# Patient Record
Sex: MALE | Race: WHITE | URBAN - METROPOLITAN AREA
[De-identification: names, ages, dates, MRNs, and addresses within clinical notes are randomized per-mention and may not be internally consistent; named-entity substitution may affect disease eponyms.]

---

## 2020-07-27 ENCOUNTER — CONVERSION ENCOUNTER (OUTPATIENT)
Dept: GASTROENTEROLOGY | Facility: CLINIC | Age: 50
End: 2020-07-27
Attending: INTERNAL MEDICINE

## 2020-09-11 ENCOUNTER — HOSPITAL ENCOUNTER (OUTPATIENT)
Dept: PREADMISSION TESTING | Facility: HOSPITAL | Age: 50
Discharge: HOME | End: 2020-09-11
Attending: INTERNAL MEDICINE

## 2020-09-12 LAB — SARS-COV-2 RNA SPEC QL NAA+PROBE: NOT DETECTED
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
## 2021-05-13 ENCOUNTER — HOSPITAL ENCOUNTER (OUTPATIENT)
Dept: VACCINE CLINIC | Facility: HOSPITAL | Age: 51
Discharge: HOME | End: 2021-05-13
Attending: INTERNAL MEDICINE

## 2022-05-02 ENCOUNTER — HOSPITAL ENCOUNTER (EMERGENCY)
Facility: HOSPITAL | Age: 52
Discharge: LEFT BEFORE DISCHARGE | End: 2022-05-02

## 2022-05-02 PROCEDURE — 99211 OFF/OP EST MAY X REQ PHY/QHP: CPT

## 2023-08-03 ENCOUNTER — APPOINTMENT (OUTPATIENT)
Dept: GENERAL RADIOLOGY | Facility: HOSPITAL | Age: 53
DRG: 639 | End: 2023-08-03
Payer: COMMERCIAL

## 2023-08-03 ENCOUNTER — HOSPITAL ENCOUNTER (INPATIENT)
Facility: HOSPITAL | Age: 53
LOS: 2 days | Discharge: HOME HEALTH | DRG: 639 | End: 2023-08-05
Attending: EMERGENCY MEDICINE | Admitting: INTERNAL MEDICINE
Payer: COMMERCIAL

## 2023-08-03 DIAGNOSIS — E13.10 DIABETIC KETOACIDOSIS WITHOUT COMA ASSOCIATED WITH OTHER SPECIFIED DIABETES MELLITUS: Primary | ICD-10-CM

## 2023-08-03 PROBLEM — E11.10 DKA (DIABETIC KETOACIDOSIS): Status: ACTIVE | Noted: 2023-08-03

## 2023-08-03 LAB
ACETONE BLD QL: ABNORMAL
ALBUMIN SERPL-MCNC: 3.9 G/DL (ref 3.5–5.2)
ALBUMIN/GLOB SERPL: 1.3 G/DL
ALP SERPL-CCNC: 80 U/L (ref 39–117)
ALT SERPL W P-5'-P-CCNC: 7 U/L (ref 1–41)
ANION GAP SERPL CALCULATED.3IONS-SCNC: 15.7 MMOL/L (ref 5–15)
ANION GAP SERPL CALCULATED.3IONS-SCNC: 19.4 MMOL/L (ref 5–15)
ANION GAP SERPL CALCULATED.3IONS-SCNC: 22 MMOL/L (ref 5–15)
AST SERPL-CCNC: 6 U/L (ref 1–40)
BACTERIA UR QL AUTO: ABNORMAL /HPF
BASE EXCESS BLDV CALC-SCNC: -12.3 MMOL/L (ref -2–2)
BASOPHILS # BLD AUTO: 0.05 10*3/MM3 (ref 0–0.2)
BASOPHILS # BLD AUTO: 0.06 10*3/MM3 (ref 0–0.2)
BASOPHILS NFR BLD AUTO: 0.6 % (ref 0–1.5)
BASOPHILS NFR BLD AUTO: 0.7 % (ref 0–1.5)
BDY SITE: ABNORMAL
BILIRUB SERPL-MCNC: 0.4 MG/DL (ref 0–1.2)
BILIRUB UR QL STRIP: NEGATIVE
BUN SERPL-MCNC: 25 MG/DL (ref 6–20)
BUN SERPL-MCNC: 27 MG/DL (ref 6–20)
BUN SERPL-MCNC: 33 MG/DL (ref 6–20)
BUN/CREAT SERPL: 34.4 (ref 7–25)
BUN/CREAT SERPL: 37.5 (ref 7–25)
BUN/CREAT SERPL: 41 (ref 7–25)
CALCIUM SPEC-SCNC: 8.3 MG/DL (ref 8.6–10.5)
CALCIUM SPEC-SCNC: 8.4 MG/DL (ref 8.6–10.5)
CALCIUM SPEC-SCNC: 9.1 MG/DL (ref 8.6–10.5)
CHLORIDE SERPL-SCNC: 101 MMOL/L (ref 98–107)
CHLORIDE SERPL-SCNC: 102 MMOL/L (ref 98–107)
CHLORIDE SERPL-SCNC: 98 MMOL/L (ref 98–107)
CLARITY UR: CLEAR
CO2 SERPL-SCNC: 12.3 MMOL/L (ref 22–29)
CO2 SERPL-SCNC: 12.6 MMOL/L (ref 22–29)
CO2 SERPL-SCNC: 13 MMOL/L (ref 22–29)
COHGB MFR BLD: 2.5 % (ref 0–1.5)
COLOR UR: YELLOW
CREAT SERPL-MCNC: 0.61 MG/DL (ref 0.76–1.27)
CREAT SERPL-MCNC: 0.72 MG/DL (ref 0.76–1.27)
CREAT SERPL-MCNC: 0.96 MG/DL (ref 0.76–1.27)
DEPRECATED RDW RBC AUTO: 39.7 FL (ref 37–54)
DEPRECATED RDW RBC AUTO: 39.8 FL (ref 37–54)
EGFRCR SERPLBLD CKD-EPI 2021: 109.2 ML/MIN/1.73
EGFRCR SERPLBLD CKD-EPI 2021: 114.9 ML/MIN/1.73
EGFRCR SERPLBLD CKD-EPI 2021: 94.5 ML/MIN/1.73
EOSINOPHIL # BLD AUTO: 0.02 10*3/MM3 (ref 0–0.4)
EOSINOPHIL # BLD AUTO: 0.07 10*3/MM3 (ref 0–0.4)
EOSINOPHIL NFR BLD AUTO: 0.2 % (ref 0.3–6.2)
EOSINOPHIL NFR BLD AUTO: 0.8 % (ref 0.3–6.2)
ERYTHROCYTE [DISTWIDTH] IN BLOOD BY AUTOMATED COUNT: 13.4 % (ref 12.3–15.4)
ERYTHROCYTE [DISTWIDTH] IN BLOOD BY AUTOMATED COUNT: 14.2 % (ref 12.3–15.4)
FHHB: 28 % (ref 0–5)
GEN 5 2HR TROPONIN T REFLEX: 8 NG/L
GLOBULIN UR ELPH-MCNC: 3 GM/DL
GLUCOSE BLDC GLUCOMTR-MCNC: 178 MG/DL (ref 70–99)
GLUCOSE BLDC GLUCOMTR-MCNC: 202 MG/DL (ref 70–99)
GLUCOSE BLDC GLUCOMTR-MCNC: 225 MG/DL (ref 70–99)
GLUCOSE BLDC GLUCOMTR-MCNC: 231 MG/DL (ref 70–99)
GLUCOSE BLDC GLUCOMTR-MCNC: 236 MG/DL (ref 70–99)
GLUCOSE BLDC GLUCOMTR-MCNC: 271 MG/DL (ref 70–99)
GLUCOSE BLDC GLUCOMTR-MCNC: 337 MG/DL (ref 70–99)
GLUCOSE BLDC GLUCOMTR-MCNC: 344 MG/DL (ref 70–99)
GLUCOSE BLDC GLUCOMTR-MCNC: 465 MG/DL (ref 70–99)
GLUCOSE SERPL-MCNC: 277 MG/DL (ref 65–99)
GLUCOSE SERPL-MCNC: 313 MG/DL (ref 65–99)
GLUCOSE SERPL-MCNC: 445 MG/DL (ref 65–99)
GLUCOSE UR STRIP-MCNC: ABNORMAL MG/DL
HBA1C MFR BLD: 10.4 % (ref 4.8–5.6)
HCO3 BLDV-SCNC: 13.4 MMOL/L (ref 22–26)
HCT VFR BLD AUTO: 50.7 % (ref 37.5–51)
HCT VFR BLD AUTO: 53 % (ref 37.5–51)
HGB BLD-MCNC: 17.9 G/DL (ref 13–17.7)
HGB BLD-MCNC: 18.8 G/DL (ref 13–17.7)
HGB BLDA-MCNC: 19.6 G/DL (ref 13.8–16.4)
HGB UR QL STRIP.AUTO: ABNORMAL
HOLD SPECIMEN: NORMAL
HOLD SPECIMEN: NORMAL
HYALINE CASTS UR QL AUTO: ABNORMAL /LPF
IMM GRANULOCYTES # BLD AUTO: 0.03 10*3/MM3 (ref 0–0.05)
IMM GRANULOCYTES # BLD AUTO: 0.04 10*3/MM3 (ref 0–0.05)
IMM GRANULOCYTES NFR BLD AUTO: 0.3 % (ref 0–0.5)
IMM GRANULOCYTES NFR BLD AUTO: 0.5 % (ref 0–0.5)
INHALED O2 CONCENTRATION: ABNORMAL %
KETONES UR QL STRIP: ABNORMAL
LEUKOCYTE ESTERASE UR QL STRIP.AUTO: NEGATIVE
LYMPHOCYTES # BLD AUTO: 1.28 10*3/MM3 (ref 0.7–3.1)
LYMPHOCYTES # BLD AUTO: 1.52 10*3/MM3 (ref 0.7–3.1)
LYMPHOCYTES NFR BLD AUTO: 15.7 % (ref 19.6–45.3)
LYMPHOCYTES NFR BLD AUTO: 17.6 % (ref 19.6–45.3)
MAGNESIUM SERPL-MCNC: 1.8 MG/DL (ref 1.6–2.6)
MAGNESIUM SERPL-MCNC: 1.9 MG/DL (ref 1.6–2.6)
MAGNESIUM SERPL-MCNC: 2.1 MG/DL (ref 1.6–2.6)
MCH RBC QN AUTO: 29 PG (ref 26.6–33)
MCH RBC QN AUTO: 29.1 PG (ref 26.6–33)
MCHC RBC AUTO-ENTMCNC: 35.3 G/DL (ref 31.5–35.7)
MCHC RBC AUTO-ENTMCNC: 35.5 G/DL (ref 31.5–35.7)
MCV RBC AUTO: 81.8 FL (ref 79–97)
MCV RBC AUTO: 82.3 FL (ref 79–97)
METHGB BLD QL: 0.1 % (ref 0–1.5)
MODALITY: ABNORMAL
MONOCYTES # BLD AUTO: 0.69 10*3/MM3 (ref 0.1–0.9)
MONOCYTES # BLD AUTO: 0.79 10*3/MM3 (ref 0.1–0.9)
MONOCYTES NFR BLD AUTO: 8.5 % (ref 5–12)
MONOCYTES NFR BLD AUTO: 9.1 % (ref 5–12)
NEUTROPHILS NFR BLD AUTO: 6.05 10*3/MM3 (ref 1.7–7)
NEUTROPHILS NFR BLD AUTO: 6.18 10*3/MM3 (ref 1.7–7)
NEUTROPHILS NFR BLD AUTO: 71.5 % (ref 42.7–76)
NEUTROPHILS NFR BLD AUTO: 74.5 % (ref 42.7–76)
NITRITE UR QL STRIP: NEGATIVE
NOTE: ABNORMAL
NRBC BLD AUTO-RTO: 0 /100 WBC (ref 0–0.2)
NRBC BLD AUTO-RTO: 0 /100 WBC (ref 0–0.2)
OSMOLALITY SERPL: 314 MOSM/KG (ref 275–300)
OXYHGB MFR BLDV: 69.4 % (ref 94–99)
PCO2 BLDV: 31.6 MM HG (ref 41–51)
PH BLDV: 7.24 PH UNITS (ref 7.31–7.41)
PH UR STRIP.AUTO: 5.5 [PH] (ref 5–8)
PHOSPHATE SERPL-MCNC: 1.8 MG/DL (ref 2.5–4.5)
PHOSPHATE SERPL-MCNC: 2.2 MG/DL (ref 2.5–4.5)
PHOSPHATE SERPL-MCNC: 2.3 MG/DL (ref 2.5–4.5)
PLATELET # BLD AUTO: 174 10*3/MM3 (ref 140–450)
PLATELET # BLD AUTO: 214 10*3/MM3 (ref 140–450)
PMV BLD AUTO: 9.2 FL (ref 6–12)
PMV BLD AUTO: 9.2 FL (ref 6–12)
PO2 BLDV: 34 MM HG (ref 35–42)
POTASSIUM SERPL-SCNC: 3.2 MMOL/L (ref 3.5–5.2)
POTASSIUM SERPL-SCNC: 3.6 MMOL/L (ref 3.5–5.2)
POTASSIUM SERPL-SCNC: 3.9 MMOL/L (ref 3.5–5.2)
PROCALCITONIN SERPL-MCNC: 0.08 NG/ML (ref 0–0.25)
PROT SERPL-MCNC: 6.9 G/DL (ref 6–8.5)
PROT UR QL STRIP: ABNORMAL
RBC # BLD AUTO: 6.16 10*6/MM3 (ref 4.14–5.8)
RBC # BLD AUTO: 6.48 10*6/MM3 (ref 4.14–5.8)
RBC # UR STRIP: ABNORMAL /HPF
REF LAB TEST METHOD: ABNORMAL
SAO2 % BLDCOV: 71.3 % (ref 45–75)
SODIUM SERPL-SCNC: 130 MMOL/L (ref 136–145)
SODIUM SERPL-SCNC: 133 MMOL/L (ref 136–145)
SODIUM SERPL-SCNC: 133 MMOL/L (ref 136–145)
SP GR UR STRIP: >1.03 (ref 1–1.03)
SQUAMOUS #/AREA URNS HPF: ABNORMAL /HPF
TROPONIN T DELTA: -1 NG/L
TROPONIN T SERPL HS-MCNC: 9 NG/L
TSH SERPL DL<=0.05 MIU/L-ACNC: 1.26 UIU/ML (ref 0.27–4.2)
UROBILINOGEN UR QL STRIP: ABNORMAL
WBC # UR STRIP: ABNORMAL /HPF
WBC NRBC COR # BLD: 8.13 10*3/MM3 (ref 3.4–10.8)
WBC NRBC COR # BLD: 8.65 10*3/MM3 (ref 3.4–10.8)
WHOLE BLOOD HOLD COAG: NORMAL
WHOLE BLOOD HOLD SPECIMEN: NORMAL

## 2023-08-03 PROCEDURE — 96368 THER/DIAG CONCURRENT INF: CPT

## 2023-08-03 PROCEDURE — 82820 HEMOGLOBIN-OXYGEN AFFINITY: CPT | Performed by: EMERGENCY MEDICINE

## 2023-08-03 PROCEDURE — 99285 EMERGENCY DEPT VISIT HI MDM: CPT

## 2023-08-03 PROCEDURE — 83735 ASSAY OF MAGNESIUM: CPT | Performed by: INTERNAL MEDICINE

## 2023-08-03 PROCEDURE — 82948 REAGENT STRIP/BLOOD GLUCOSE: CPT

## 2023-08-03 PROCEDURE — 82805 BLOOD GASES W/O2 SATURATION: CPT | Performed by: EMERGENCY MEDICINE

## 2023-08-03 PROCEDURE — 25010000002 ENOXAPARIN PER 10 MG: Performed by: INTERNAL MEDICINE

## 2023-08-03 PROCEDURE — 36415 COLL VENOUS BLD VENIPUNCTURE: CPT | Performed by: EMERGENCY MEDICINE

## 2023-08-03 PROCEDURE — 83930 ASSAY OF BLOOD OSMOLALITY: CPT | Performed by: EMERGENCY MEDICINE

## 2023-08-03 PROCEDURE — 85025 COMPLETE CBC W/AUTO DIFF WBC: CPT | Performed by: EMERGENCY MEDICINE

## 2023-08-03 PROCEDURE — 96366 THER/PROPH/DIAG IV INF ADDON: CPT

## 2023-08-03 PROCEDURE — 93005 ELECTROCARDIOGRAM TRACING: CPT | Performed by: EMERGENCY MEDICINE

## 2023-08-03 PROCEDURE — 80053 COMPREHEN METABOLIC PANEL: CPT | Performed by: EMERGENCY MEDICINE

## 2023-08-03 PROCEDURE — 0 POTASSIUM CHLORIDE PER 2 MEQ: Performed by: INTERNAL MEDICINE

## 2023-08-03 PROCEDURE — 83036 HEMOGLOBIN GLYCOSYLATED A1C: CPT | Performed by: EMERGENCY MEDICINE

## 2023-08-03 PROCEDURE — 96372 THER/PROPH/DIAG INJ SC/IM: CPT

## 2023-08-03 PROCEDURE — 96365 THER/PROPH/DIAG IV INF INIT: CPT

## 2023-08-03 PROCEDURE — 25010000002 SODIUM CHLORIDE 0.9 % WITH KCL 20 MEQ 20-0.9 MEQ/L-% SOLUTION: Performed by: INTERNAL MEDICINE

## 2023-08-03 PROCEDURE — 25010000002 CALCIUM GLUCONATE 2-0.675 GM/100ML-% SOLUTION: Performed by: INTERNAL MEDICINE

## 2023-08-03 PROCEDURE — 81001 URINALYSIS AUTO W/SCOPE: CPT | Performed by: EMERGENCY MEDICINE

## 2023-08-03 PROCEDURE — 71045 X-RAY EXAM CHEST 1 VIEW: CPT

## 2023-08-03 PROCEDURE — 93010 ELECTROCARDIOGRAM REPORT: CPT | Performed by: INTERNAL MEDICINE

## 2023-08-03 PROCEDURE — 99291 CRITICAL CARE FIRST HOUR: CPT | Performed by: INTERNAL MEDICINE

## 2023-08-03 PROCEDURE — 84100 ASSAY OF PHOSPHORUS: CPT | Performed by: INTERNAL MEDICINE

## 2023-08-03 PROCEDURE — 82009 KETONE BODYS QUAL: CPT | Performed by: EMERGENCY MEDICINE

## 2023-08-03 PROCEDURE — 84100 ASSAY OF PHOSPHORUS: CPT | Performed by: EMERGENCY MEDICINE

## 2023-08-03 PROCEDURE — 83735 ASSAY OF MAGNESIUM: CPT | Performed by: EMERGENCY MEDICINE

## 2023-08-03 PROCEDURE — 84484 ASSAY OF TROPONIN QUANT: CPT | Performed by: EMERGENCY MEDICINE

## 2023-08-03 PROCEDURE — 84145 PROCALCITONIN (PCT): CPT | Performed by: INTERNAL MEDICINE

## 2023-08-03 PROCEDURE — 25010000002 MAGNESIUM SULFATE IN D5W 1G/100ML (PREMIX) 1-5 GM/100ML-% SOLUTION: Performed by: INTERNAL MEDICINE

## 2023-08-03 PROCEDURE — 84443 ASSAY THYROID STIM HORMONE: CPT | Performed by: INTERNAL MEDICINE

## 2023-08-03 RX ORDER — MAGNESIUM SULFATE 1 G/100ML
1 INJECTION INTRAVENOUS
Status: COMPLETED | OUTPATIENT
Start: 2023-08-03 | End: 2023-08-03

## 2023-08-03 RX ORDER — SODIUM CHLORIDE 0.9 % (FLUSH) 0.9 %
10 SYRINGE (ML) INJECTION AS NEEDED
Status: DISCONTINUED | OUTPATIENT
Start: 2023-08-03 | End: 2023-08-04

## 2023-08-03 RX ORDER — DEXTROSE MONOHYDRATE 25 G/50ML
10-50 INJECTION, SOLUTION INTRAVENOUS
Status: DISCONTINUED | OUTPATIENT
Start: 2023-08-03 | End: 2023-08-05 | Stop reason: HOSPADM

## 2023-08-03 RX ORDER — FENTANYL/ROPIVACAINE/NS/PF 2-625MCG/1
15 PLASTIC BAG, INJECTION (ML) EPIDURAL
Status: COMPLETED | OUTPATIENT
Start: 2023-08-03 | End: 2023-08-03

## 2023-08-03 RX ORDER — AMOXICILLIN 250 MG
1 CAPSULE ORAL 2 TIMES DAILY
Status: DISCONTINUED | OUTPATIENT
Start: 2023-08-03 | End: 2023-08-05 | Stop reason: HOSPADM

## 2023-08-03 RX ORDER — HYDROCODONE BITARTRATE AND ACETAMINOPHEN 5; 325 MG/1; MG/1
1 TABLET ORAL EVERY 4 HOURS PRN
Status: DISCONTINUED | OUTPATIENT
Start: 2023-08-03 | End: 2023-08-05 | Stop reason: HOSPADM

## 2023-08-03 RX ORDER — ONDANSETRON 2 MG/ML
4 INJECTION INTRAMUSCULAR; INTRAVENOUS EVERY 4 HOURS PRN
Status: DISCONTINUED | OUTPATIENT
Start: 2023-08-03 | End: 2023-08-05 | Stop reason: HOSPADM

## 2023-08-03 RX ORDER — SODIUM CHLORIDE 9 MG/ML
250 INJECTION, SOLUTION INTRAVENOUS CONTINUOUS PRN
Status: DISCONTINUED | OUTPATIENT
Start: 2023-08-03 | End: 2023-08-04

## 2023-08-03 RX ORDER — SODIUM CHLORIDE AND POTASSIUM CHLORIDE 300; 900 MG/100ML; MG/100ML
250 INJECTION, SOLUTION INTRAVENOUS CONTINUOUS PRN
Status: DISCONTINUED | OUTPATIENT
Start: 2023-08-03 | End: 2023-08-04

## 2023-08-03 RX ORDER — FAMOTIDINE 20 MG/1
40 TABLET, FILM COATED ORAL DAILY
Status: DISCONTINUED | OUTPATIENT
Start: 2023-08-03 | End: 2023-08-05 | Stop reason: HOSPADM

## 2023-08-03 RX ORDER — MORPHINE SULFATE 2 MG/ML
2 INJECTION, SOLUTION INTRAMUSCULAR; INTRAVENOUS
Status: DISCONTINUED | OUTPATIENT
Start: 2023-08-03 | End: 2023-08-05 | Stop reason: HOSPADM

## 2023-08-03 RX ORDER — SODIUM CHLORIDE 0.9 % (FLUSH) 0.9 %
10 SYRINGE (ML) INJECTION EVERY 12 HOURS SCHEDULED
Status: DISCONTINUED | OUTPATIENT
Start: 2023-08-03 | End: 2023-08-05 | Stop reason: HOSPADM

## 2023-08-03 RX ORDER — BISACODYL 10 MG
10 SUPPOSITORY, RECTAL RECTAL DAILY PRN
Status: DISCONTINUED | OUTPATIENT
Start: 2023-08-03 | End: 2023-08-05 | Stop reason: HOSPADM

## 2023-08-03 RX ORDER — DEXTROSE AND SODIUM CHLORIDE 5; .45 G/100ML; G/100ML
150 INJECTION, SOLUTION INTRAVENOUS CONTINUOUS PRN
Status: DISCONTINUED | OUTPATIENT
Start: 2023-08-03 | End: 2023-08-04

## 2023-08-03 RX ORDER — SODIUM CHLORIDE 9 MG/ML
40 INJECTION, SOLUTION INTRAVENOUS AS NEEDED
Status: DISCONTINUED | OUTPATIENT
Start: 2023-08-03 | End: 2023-08-04

## 2023-08-03 RX ORDER — SODIUM CHLORIDE 450 MG/100ML
250 INJECTION, SOLUTION INTRAVENOUS CONTINUOUS PRN
Status: DISCONTINUED | OUTPATIENT
Start: 2023-08-03 | End: 2023-08-04

## 2023-08-03 RX ORDER — DEXTROSE MONOHYDRATE, SODIUM CHLORIDE, AND POTASSIUM CHLORIDE 50; 1.49; 4.5 G/1000ML; G/1000ML; G/1000ML
150 INJECTION, SOLUTION INTRAVENOUS CONTINUOUS PRN
Status: DISCONTINUED | OUTPATIENT
Start: 2023-08-03 | End: 2023-08-04

## 2023-08-03 RX ORDER — ACETAMINOPHEN 325 MG/1
650 TABLET ORAL EVERY 4 HOURS PRN
Status: DISCONTINUED | OUTPATIENT
Start: 2023-08-03 | End: 2023-08-05 | Stop reason: HOSPADM

## 2023-08-03 RX ORDER — DEXTROSE MONOHYDRATE, SODIUM CHLORIDE, AND POTASSIUM CHLORIDE 50; 2.98; 4.5 G/1000ML; G/1000ML; G/1000ML
150 INJECTION, SOLUTION INTRAVENOUS CONTINUOUS PRN
Status: DISCONTINUED | OUTPATIENT
Start: 2023-08-03 | End: 2023-08-04

## 2023-08-03 RX ORDER — POLYETHYLENE GLYCOL 3350 17 G/17G
17 POWDER, FOR SOLUTION ORAL DAILY PRN
Status: DISCONTINUED | OUTPATIENT
Start: 2023-08-03 | End: 2023-08-05 | Stop reason: HOSPADM

## 2023-08-03 RX ORDER — DEXTROSE AND SODIUM CHLORIDE 5; .9 G/100ML; G/100ML
150 INJECTION, SOLUTION INTRAVENOUS CONTINUOUS PRN
Status: DISCONTINUED | OUTPATIENT
Start: 2023-08-03 | End: 2023-08-04

## 2023-08-03 RX ORDER — SODIUM CHLORIDE AND POTASSIUM CHLORIDE 150; 450 MG/100ML; MG/100ML
250 INJECTION, SOLUTION INTRAVENOUS CONTINUOUS PRN
Status: DISCONTINUED | OUTPATIENT
Start: 2023-08-03 | End: 2023-08-04

## 2023-08-03 RX ORDER — NALOXONE HCL 0.4 MG/ML
0.4 VIAL (ML) INJECTION
Status: DISCONTINUED | OUTPATIENT
Start: 2023-08-03 | End: 2023-08-05 | Stop reason: HOSPADM

## 2023-08-03 RX ORDER — BISACODYL 5 MG/1
5 TABLET, DELAYED RELEASE ORAL DAILY PRN
Status: DISCONTINUED | OUTPATIENT
Start: 2023-08-03 | End: 2023-08-05 | Stop reason: HOSPADM

## 2023-08-03 RX ORDER — PHENYTOIN SODIUM 100 MG/1
200 CAPSULE, EXTENDED RELEASE ORAL 2 TIMES DAILY
Status: DISCONTINUED | OUTPATIENT
Start: 2023-08-03 | End: 2023-08-05 | Stop reason: HOSPADM

## 2023-08-03 RX ORDER — GLIPIZIDE 5 MG
5 TABLET, EXTENDED RELEASE 24 HR ORAL DAILY
COMMUNITY
Start: 2023-05-02 | End: 2023-08-05 | Stop reason: HOSPADM

## 2023-08-03 RX ORDER — PHENYTOIN SODIUM 100 MG/1
200 CAPSULE, EXTENDED RELEASE ORAL 2 TIMES DAILY
COMMUNITY

## 2023-08-03 RX ORDER — DAPAGLIFLOZIN 10 MG/1
10 TABLET, FILM COATED ORAL DAILY
COMMUNITY
End: 2023-08-05 | Stop reason: HOSPADM

## 2023-08-03 RX ORDER — SODIUM CHLORIDE AND POTASSIUM CHLORIDE 150; 900 MG/100ML; MG/100ML
250 INJECTION, SOLUTION INTRAVENOUS CONTINUOUS PRN
Status: DISCONTINUED | OUTPATIENT
Start: 2023-08-03 | End: 2023-08-04

## 2023-08-03 RX ORDER — TEMAZEPAM 7.5 MG/1
15 CAPSULE ORAL NIGHTLY PRN
Status: DISCONTINUED | OUTPATIENT
Start: 2023-08-03 | End: 2023-08-04

## 2023-08-03 RX ORDER — ENOXAPARIN SODIUM 100 MG/ML
40 INJECTION SUBCUTANEOUS NIGHTLY
Status: DISCONTINUED | OUTPATIENT
Start: 2023-08-03 | End: 2023-08-05 | Stop reason: HOSPADM

## 2023-08-03 RX ORDER — ALPRAZOLAM 0.25 MG/1
0.25 TABLET ORAL EVERY 6 HOURS PRN
Status: DISCONTINUED | OUTPATIENT
Start: 2023-08-03 | End: 2023-08-04

## 2023-08-03 RX ORDER — ALUMINA, MAGNESIA, AND SIMETHICONE 2400; 2400; 240 MG/30ML; MG/30ML; MG/30ML
15 SUSPENSION ORAL EVERY 6 HOURS PRN
Status: DISCONTINUED | OUTPATIENT
Start: 2023-08-03 | End: 2023-08-05 | Stop reason: HOSPADM

## 2023-08-03 RX ORDER — NICOTINE POLACRILEX 4 MG
15 LOZENGE BUCCAL
Status: DISCONTINUED | OUTPATIENT
Start: 2023-08-03 | End: 2023-08-04 | Stop reason: SDUPTHER

## 2023-08-03 RX ORDER — DEXTROSE MONOHYDRATE, SODIUM CHLORIDE, AND POTASSIUM CHLORIDE 50; 2.98; 9 G/1000ML; G/1000ML; G/1000ML
150 INJECTION, SOLUTION INTRAVENOUS CONTINUOUS PRN
Status: DISCONTINUED | OUTPATIENT
Start: 2023-08-03 | End: 2023-08-04

## 2023-08-03 RX ORDER — DEXTROSE MONOHYDRATE, SODIUM CHLORIDE, AND POTASSIUM CHLORIDE 50; 1.49; 9 G/1000ML; G/1000ML; G/1000ML
150 INJECTION, SOLUTION INTRAVENOUS CONTINUOUS PRN
Status: DISCONTINUED | OUTPATIENT
Start: 2023-08-03 | End: 2023-08-04

## 2023-08-03 RX ORDER — CALCIUM GLUCONATE 20 MG/ML
2000 INJECTION, SOLUTION INTRAVENOUS ONCE
Status: COMPLETED | OUTPATIENT
Start: 2023-08-03 | End: 2023-08-03

## 2023-08-03 RX ORDER — TOPIRAMATE 100 MG/1
100 TABLET, FILM COATED ORAL 2 TIMES DAILY
COMMUNITY

## 2023-08-03 RX ORDER — TOPIRAMATE 100 MG/1
100 TABLET, FILM COATED ORAL 2 TIMES DAILY
Status: DISCONTINUED | OUTPATIENT
Start: 2023-08-03 | End: 2023-08-04

## 2023-08-03 RX ADMIN — SODIUM CHLORIDE 1000 ML: 9 INJECTION, SOLUTION INTRAVENOUS at 19:36

## 2023-08-03 RX ADMIN — POTASSIUM CHLORIDE AND SODIUM CHLORIDE 250 ML/HR: 900; 150 INJECTION, SOLUTION INTRAVENOUS at 22:32

## 2023-08-03 RX ADMIN — SENNOSIDES AND DOCUSATE SODIUM 1 TABLET: 50; 8.6 TABLET ORAL at 21:15

## 2023-08-03 RX ADMIN — SODIUM CHLORIDE 1000 ML: 9 INJECTION, SOLUTION INTRAVENOUS at 20:45

## 2023-08-03 RX ADMIN — Medication 10 ML: at 21:16

## 2023-08-03 RX ADMIN — Medication 10 ML: at 21:15

## 2023-08-03 RX ADMIN — POTASSIUM CHLORIDE AND SODIUM CHLORIDE 250 ML/HR: 450; 150 INJECTION, SOLUTION INTRAVENOUS at 18:00

## 2023-08-03 RX ADMIN — ENOXAPARIN SODIUM 40 MG: 100 INJECTION SUBCUTANEOUS at 21:15

## 2023-08-03 RX ADMIN — POTASSIUM PHOSPHATE, MONOBASIC AND POTASSIUM PHOSPHATE, DIBASIC 15 MMOL: 224; 236 INJECTION, SOLUTION, CONCENTRATE INTRAVENOUS at 21:46

## 2023-08-03 RX ADMIN — CALCIUM GLUCONATE 2000 MG: 20 INJECTION, SOLUTION INTRAVENOUS at 18:46

## 2023-08-03 RX ADMIN — MAGNESIUM SULFATE HEPTAHYDRATE 1 G: 10 INJECTION, SOLUTION INTRAVENOUS at 19:59

## 2023-08-03 RX ADMIN — INSULIN HUMAN 2.8 UNITS/HR: 1 INJECTION, SOLUTION INTRAVENOUS at 15:41

## 2023-08-03 RX ADMIN — SODIUM CHLORIDE 2000 ML: 9 INJECTION, SOLUTION INTRAVENOUS at 15:40

## 2023-08-03 RX ADMIN — TOPIRAMATE 100 MG: 100 TABLET, FILM COATED ORAL at 21:15

## 2023-08-03 RX ADMIN — FAMOTIDINE 40 MG: 20 TABLET ORAL at 17:59

## 2023-08-03 RX ADMIN — PHENYTOIN SODIUM 200 MG: 100 CAPSULE, EXTENDED RELEASE ORAL at 21:15

## 2023-08-03 RX ADMIN — MAGNESIUM SULFATE HEPTAHYDRATE 1 G: 10 INJECTION, SOLUTION INTRAVENOUS at 21:17

## 2023-08-03 RX ADMIN — POTASSIUM PHOSPHATE, MONOBASIC AND POTASSIUM PHOSPHATE, DIBASIC 15 MMOL: 224; 236 INJECTION, SOLUTION, CONCENTRATE INTRAVENOUS at 18:38

## 2023-08-03 NOTE — H&P
[facility]   HISTORY AND PHYSICAL    Patient Name: John Ramos  : 1970  MRN: [id number]  Primary Care Physician:  Provider, No Known  Date of admission: 8/3/2023    Subjective   Subjective     Chief Complaint:   High sugars      HPI:    John Ramos is a 53 y.o. male who presents to the emergency department for evaluation of elevated blood sugar.  Patient is a known diabetic but is not on insulin reports he has had vomiting for the last several days.  He presented to his primary doctor's office he reports he looked very dehydrated and had elevated blood sugar.  On arrival to the emergency department patient does complain of being thirsty and having dry mucous membranes, work-up revealed high sugars and DKA.  Patient started on aggressive fluid hydration and admitted to medical service.  When I saw patient in ICU he is awake alert eating his dinner, he does not have any nausea, feels better      Review of Systems:    Fatigue and weakness.  High sugar.  Nausea vomiting.  No chest pain.  No shortness of breath    Personal History     Past Medical History:   Diagnosis Date   • Brain injury    • Diabetes    • Seizures        Past Surgical History:   Procedure Laterality Date   • BRAIN SURGERY     • FRACTURE SURGERY         Family History: family history is not on file. Otherwise pertinent FHx was reviewed and not pertinent to current issue.    Social History:  reports that he has never smoked. He has never used smokeless tobacco. He reports that he does not drink alcohol and does not use drugs.    Home Medications:  dapagliflozin Propanediol, glipizide, phenytoin ER, and topiramate      Allergies:  No Known Allergies    Objective   Objective     Vitals:   Temp:  [98.2 °F (36.8 °C)] 98.2 °F (36.8 °C)  Heart Rate:  [] 105  Resp:  [15] 15  BP: (141-159)/() 143/80    Physical Exam    Elderly looking male not in acute distress.  Oral mucosa moist  Neck supple   heart regular slightly  tachycardic  Lungs clear.  Neuro awake alert and oriented  Extremities no edema  .      I have personally reviewed the results from the time of this admission to 8/3/2023 18:30 EDT and agree with these findings:  [x]  Laboratory  []  Microbiology  [x]  Radiology  [x]  EKG/Telemetry   []  Cardiology/Vascular   []  Pathology  []  Old records  []  Other:    CBC:    WBC   Date Value Ref Range Status   08/03/2023 8.65 3.40 - 10.80 10*3/mm3 Final     RBC   Date Value Ref Range Status   08/03/2023 6.16 (H) 4.14 - 5.80 10*6/mm3 Final     Hemoglobin   Date Value Ref Range Status   08/03/2023 17.9 (H) 13.0 - 17.7 g/dL Final     Hematocrit   Date Value Ref Range Status   08/03/2023 50.7 37.5 - 51.0 % Final     MCV   Date Value Ref Range Status   08/03/2023 82.3 79.0 - 97.0 fL Final     MCH   Date Value Ref Range Status   08/03/2023 29.1 26.6 - 33.0 pg Final     MCHC   Date Value Ref Range Status   08/03/2023 35.3 31.5 - 35.7 g/dL Final     RDW   Date Value Ref Range Status   08/03/2023 13.4 12.3 - 15.4 % Final     RDW-SD   Date Value Ref Range Status   08/03/2023 39.7 37.0 - 54.0 fl Final     MPV   Date Value Ref Range Status   08/03/2023 9.2 6.0 - 12.0 fL Final     Platelets   Date Value Ref Range Status   08/03/2023 174 140 - 450 10*3/mm3 Final     Neutrophil %   Date Value Ref Range Status   08/03/2023 71.5 42.7 - 76.0 % Final     Lymphocyte %   Date Value Ref Range Status   08/03/2023 17.6 (L) 19.6 - 45.3 % Final     Monocyte %   Date Value Ref Range Status   08/03/2023 9.1 5.0 - 12.0 % Final     Eosinophil %   Date Value Ref Range Status   08/03/2023 0.8 0.3 - 6.2 % Final     Basophil %   Date Value Ref Range Status   08/03/2023 0.7 0.0 - 1.5 % Final     Immature Grans %   Date Value Ref Range Status   08/03/2023 0.3 0.0 - 0.5 % Final     Neutrophils, Absolute   Date Value Ref Range Status   08/03/2023 6.18 1.70 - 7.00 10*3/mm3 Final     Lymphocytes, Absolute   Date Value Ref Range Status   08/03/2023 1.52 0.70 - 3.10  10*3/mm3 Final     Monocytes, Absolute   Date Value Ref Range Status   08/03/2023 0.79 0.10 - 0.90 10*3/mm3 Final     Eosinophils, Absolute   Date Value Ref Range Status   08/03/2023 0.07 0.00 - 0.40 10*3/mm3 Final     Basophils, Absolute   Date Value Ref Range Status   08/03/2023 0.06 0.00 - 0.20 10*3/mm3 Final     Immature Grans, Absolute   Date Value Ref Range Status   08/03/2023 0.03 0.00 - 0.05 10*3/mm3 Final     nRBC   Date Value Ref Range Status   08/03/2023 0.0 0.0 - 0.2 /100 WBC Final        BMP:    Lab Results   Component Value Date    GLUCOSE 313 (H) 08/03/2023    BUN 27 (H) 08/03/2023    CREATININE 0.72 (L) 08/03/2023    BCR 37.5 (H) 08/03/2023    K 3.2 (L) 08/03/2023    CO2 12.6 (L) 08/03/2023    CALCIUM 8.4 (L) 08/03/2023    ALBUMIN 3.9 08/03/2023    AST 6 08/03/2023    ALT 7 08/03/2023        No radiology results for the last day           Assessment & Plan   Assessment / Plan       Current Diagnosis:  Active Hospital Problems    Diagnosis    • **DKA (diabetic ketoacidosis)      Plan:   Aggressive fluids.  Insulin drip.  DKA protocol.  Check A1c and thyroid.  Restart essential home meds including Dilantin.  Check labs in a.m.  Discussed with RN at bedside      DVT prophylaxis:  Medical DVT prophylaxis orders are present.    GI Prophylaxis:       Pepcid    CODE STATUS:    Level Of Support Discussed With: Patient  Code Status (Patient has no pulse and is not breathing): CPR (Attempt to Resuscitate)  Medical Interventions (Patient has pulse or is breathing): Full Support  Release to patient: Routine Release    Admission Status:  I believe this patient meets inpatient status.             I have dictated this note utilizing Dragon Dictation.             Please note that portions of this note were completed with a voice recognition program.             Part of this note may be an electronic transcription/translation of spoken language to printed text         using the Dragon Dictation System.        Electronically signed by Adarsh Veronica MD, 08/03/23, 6:30 PM EDT.    Total time spent with in evaluation and management: 36

## 2023-08-03 NOTE — CONSULTS
Pulmonary / Critical Care Consult Note      Patient Name: John Ramso  : 1970  MRN: [id number]  Primary Care Physician:  Provider, No Known  Referring Physician: No ref. provider found  Date of admission: 8/3/2023    Subjective   Subjective     Reason for Consult/ Chief Complaint: Diabetic ketoacidosis    HPI:  John Ramos is a 53 y.o. male with history of diabetes mellitus type 2, on glipizide and Farxiga at home.  He was having some nausea and vomiting for the past 5 days and had not taken his medication.  He presented to the ER with nausea, vomiting and abdominal pain for the last 5 days.  He was found to have blood sugar of 465, high anion gap at 22, low potassium, low sodium, Phos of 1.8.  ABG showed pH of 7.22, PCO2 31, PO2 34.  Bicarb 13.4.  He was found to be positive for serum acetone.  He is being admitted to ICU with diabetic ketoacidosis.  Pulmonary critical care services consulted for assistance with management of his acute issues.  He is complaining of some abdominal pain.  He is currently on insulin drip at 3.5 units/h.  He is on fluid exchange per DKA protocol.  He has some nausea, still has some abdominal pain.  No cough.  No fever or chills.  No nausea or vomiting.  Does not have any known heart or lung disease.    Review of Systems  General:  Fatigue, No Fever  HEENT: No dysphagia, No Visual Changes, no rhinorrhea  Respiratory:  + cough,+Dyspnea, No Pleuritic Pain, no wheezing, no hemoptysis  Cardiovascular: Denies chest pain, denies palpitations,+FINLEY, No Chest Pressure  Gastrointestinal:  Abdominal Pain, Nausea, Vomiting, No Diarrhea  Genitourinary:  No Dysuria, No Frequency, No Hesitancy  Musculoskeletal: No muscle pain or swelling  Endocrine:  No Heat Intolerance, No Cold Intolerance, Fatigue  Hematologic:  No Bleeding, No Bruising  Psychiatric:  No Anxiety, No Depression  Neurologic:  No Confusion, no Dysarthria, No Headaches  Skin:  No Rash, No Open  Wounds        Personal History     Past Medical History:   Diagnosis Date   • Brain injury    • Diabetes    • Seizures        Past Surgical History:   Procedure Laterality Date   • BRAIN SURGERY     • FRACTURE SURGERY         Family History: No known family history of chronic lung disease or heart disease.    Social History:  reports that he has never smoked. He has never used smokeless tobacco. Drug use questions deferred to the physician. He reports that he does not drink alcohol.    Home Medications:  dapagliflozin Propanediol, glipizide, phenytoin ER, and topiramate    Allergies:  No Known Allergies    Objective    Objective     Vitals:   Temp:  [98.2 °F (36.8 °C)] 98.2 °F (36.8 °C)  Heart Rate:  [101-109] 101  Resp:  [15] 15  BP: (141-158)/(80-82) 158/80    Physical Exam:  Vital Signs Reviewed   WDWN, Alert, in mild distress, has conversational dyspnea  HEENT:  PERRL, EOMI.  OP, nares clear, no sinus tenderness  Neck:  Supple, no JVD, no thyromegaly  Lymph: no axillary, cervical, supraclavicular lymphadenopathy noted bilaterally  Chest:  good aeration, clear to auscultation bilaterally, tympanic to percussion bilaterally, no work of breathing noted  CV: RRR, no MGR, pulses 2+, equal  Abd:  Soft, NT, ND, + BS, no HSM  EXT:  no clubbing, no cyanosis, no edema, no joint tenderness  Neuro:  A&Ox3, CN grossly intact, no focal deficits  Skin: No rashes or lesions noted      Result Review    Result Review:  I have personally reviewed the results from the time of this admission to 8/3/2023 18:16 EDT and agree with these findings:  [x]  Laboratory  [x]  Microbiology  [x]  Radiology  [x]  EKG/Telemetry   [x]  Cardiology/Vascular   []  Pathology  []  Old records  []  Other:  Most notable findings include:         Lab 08/03/23  1718 08/03/23  1228   WBC 8.65 8.13   HEMOGLOBIN 17.9* 18.8*   HEMATOCRIT 50.7 53.0*   PLATELETS 174 214   SODIUM 133* 133*   POTASSIUM 3.2* 3.9   CHLORIDE 101 98   CO2 12.6* 13.0*   BUN 27* 33*    CREATININE 0.72* 0.96   GLUCOSE 313* 445*   CALCIUM 8.4* 9.1   PHOSPHORUS 1.8* 2.3*   TOTAL PROTEIN  --  6.9   ALBUMIN  --  3.9   GLOBULIN  --  3.0       XR Chest 1 View    Result Date: 8/3/2023  PROCEDURE: XR CHEST 1 VW  COMPARISON: None  INDICATIONS: Assess for Fluid Overload  FINDINGS:   The lungs are well-expanded. The heart and pulmonary vasculature are within normal limits. No pleural effusions are identified. There are no active appearing infiltrates.  IMPRESSION: No active disease.  RADHA SAMSON MD       Electronically Signed and Approved By: RADHA SAMSON MD on 8/03/2023 at 15:20              Serum acetone positive.  VBG with pH 7.24, PCO2 31, PO2 of 34, bicarb 13.4.    Assessment & Plan   Assessment / Plan     Active Hospital Problems:  Active Hospital Problems    Diagnosis    • **DKA (diabetic ketoacidosis)          Impression:  Diabetic ketoacidosis  Noncompliance with medications, patient was supposed to be on glipizide and Farxiga at home  Severe hypophosphatemia and hypokalemia  Hypocalcemia  Volume depletion  Severe hyperglycemia  Anion gap metabolic acidosis  Electrolyte derangements    Plan:  Continue with insulin drip, currently running at 3.5 units/h  Start IV fluid boluses, and fluid exchange per DKA protocol.  Replete IV potassium phosphate.  Replete calcium gluconate IV.  Keep him n.p.o. for now.  Once anion gap closes, will start oral diet.  Will need diabetes management education.  Nutrition consult.  Monitor hemodynamics closely.      Patient is critically ill in ICU with diabetic ketoacidosis, severe hypophosphatemia, hypokalemia, hypocalcemia, volume depletion, severe hyperglycemia, anion gap metabolic acidosis and electrolyte derangements.  I spent 33 minutes of critical care time, excluding any procedure notes, in review, analysis, obtaining history and physical, formulating care plan, and I led multi-disciplinary critical care rounds with bedside nurse, respiratory therapist,  clinical pharmacist and other allied services. I have discussed the case with primary service and other consultants as well.     Electronically signed by Rocky Galvan MD, 8/3/2023, 18:16 EDT.

## 2023-08-03 NOTE — ED PROVIDER NOTES
Time: 2:15 PM EDT  Date of encounter:  8/3/2023  Independent Historian/Clinical History and Information was obtained by:   Patient    History is limited by: N/A    Chief Complaint: High blood sugar      History of Present Illness:  Patient is a 53 y.o. year old male who presents to the emergency department for evaluation of elevated blood sugar.  Patient is a known diabetic but is not on insulin reports he has had vomiting for the last several days.  He presented to his primary doctor's office he reports he looked very dehydrated and had elevated blood sugar.  On arrival to the emergency department patient does complain of being thirsty and having dry mucous membranes.    HPI    Patient Care Team  Primary Care Provider: Provider, No Known    Past Medical History:     No Known Allergies  Past Medical History:   Diagnosis Date    Brain injury     Diabetes      History reviewed. No pertinent surgical history.  History reviewed. No pertinent family history.    Home Medications:  Prior to Admission medications    Not on File        Social History:   Social History     Tobacco Use    Smoking status: Never    Smokeless tobacco: Never   Vaping Use    Vaping Use: Never used   Substance Use Topics    Alcohol use: Never    Drug use: Defer         Review of Systems:  Review of Systems   Constitutional:  Negative for chills and fever.   HENT:  Negative for congestion, rhinorrhea and sore throat.    Eyes:  Negative for pain and visual disturbance.   Respiratory:  Negative for apnea, cough, chest tightness and shortness of breath.    Cardiovascular:  Negative for chest pain and palpitations.   Gastrointestinal:  Positive for nausea and vomiting. Negative for abdominal pain and diarrhea.   Genitourinary:  Negative for difficulty urinating and dysuria.   Musculoskeletal:  Negative for joint swelling and myalgias.   Skin:  Negative for color change.   Neurological:  Negative for seizures and headaches.   Psychiatric/Behavioral:  "Negative.     All other systems reviewed and are negative.     Physical Exam:  /82 (BP Location: Right arm)   Pulse 109   Temp 98.2 °F (36.8 °C) (Oral)   Resp 15   Ht 172.7 cm (68\")   Wt 96.5 kg (212 lb 11.9 oz)   SpO2 95%   BMI 32.35 kg/m²     Physical Exam  Vitals and nursing note reviewed.   Constitutional:       General: He is not in acute distress.     Appearance: Normal appearance. He is not toxic-appearing.   HENT:      Head: Normocephalic and atraumatic.      Jaw: There is normal jaw occlusion.      Mouth/Throat:      Mouth: Mucous membranes are dry.   Eyes:      General: Lids are normal.      Extraocular Movements: Extraocular movements intact.      Conjunctiva/sclera: Conjunctivae normal.      Pupils: Pupils are equal, round, and reactive to light.   Cardiovascular:      Rate and Rhythm: Regular rhythm. Tachycardia present.      Pulses: Normal pulses.      Heart sounds: Normal heart sounds.   Pulmonary:      Effort: Pulmonary effort is normal. No respiratory distress.      Breath sounds: Normal breath sounds. No wheezing or rhonchi.   Abdominal:      General: Abdomen is flat.      Palpations: Abdomen is soft.      Tenderness: There is no abdominal tenderness. There is no guarding or rebound.   Musculoskeletal:         General: Normal range of motion.      Cervical back: Normal range of motion and neck supple.      Right lower leg: No edema.      Left lower leg: No edema.   Skin:     General: Skin is warm and dry.   Neurological:      Mental Status: He is alert and oriented to person, place, and time. Mental status is at baseline.   Psychiatric:         Mood and Affect: Mood normal.                Procedures:  Procedures      Medical Decision Making:      Comorbidities that affect care:    Diabetes    External Notes reviewed:    None      The following orders were placed and all results were independently analyzed by me:  Orders Placed This Encounter   Procedures    XR Chest 1 View    [place] " Draw    Comprehensive Metabolic Panel    Urinalysis With Microscopic If Indicated (No Culture) - Urine, Clean Catch    CBC Auto Differential    Blood Gas, Venous -With Co-Ox Panel: Yes    Acetone    Phosphorus    Magnesium    Osmolality, Serum    Hemoglobin A1c    High Sensitivity Troponin T    Basic Metabolic Panel    Magnesium    Phosphorus    CBC Auto Differential    High Sensitivity Troponin T 2Hr    NPO Diet NPO Type: Strict NPO    Undress & Gown    Vital Signs    Vital Signs    Strict Intake & Output    Daily Weights    Continuous Pulse Oximetry    Saline Lock & Maintain IV Access    Corrected Serum Sodium = Measured Sodium + [1.6 x ((Glucose - 100)/100)]    Do NOT Discontinue Insulin Infusion Until 2 Hours After First Dose of Basal SQ Insulin    Prior to Initiating Glucommander™, Ensure All Prior Insulin Orders Are Discontinued    Do Not Start Insulin Infusion if Potassium < 3.3    Use a Dedicated Line for Insulin Infusion (If Possible).  May Use a Carrier Fluid of NS at KVO Rate if Insulin Rate is Insufficient to Maintain IV Patency.  Prime IV Line With Insulin Infusion    Glucommander Must Be Discontinued if Insulin Infusion is Discontinued.  If Insulin Infusion is Restarted, Previous Glucommander Settings Must Be Discontinued and Re-Entered From New Order    Once DKA Meets Resolution Criteria - Call Provider for Transition Orders From IV to SQ Insulin    Utilize the Start Meal Feature / Meal Bolus Feature in Glucommander if Patient Starts a Diet or Bolus Tube Feedings    Notify Provider - Insulin Infusion    RN to Release PRN POC Glucose Orders Per Glucommander    RN to Order STAT Glucose For Any POC Glucose <10 or >600    If Insulin Infusion is Paused - Follow Glucommander Instructions    DKA Patients With Phosphorus Level 1 or Higher Do NOT Require Replacement (While Insulin Infusing)    Inpatient Diabetes Educator Consult    Inpatient Hospitalist Consult    IP General Consult (Use specialty-specific  consult if known)    Oxygen Therapy- Nasal Cannula; Titrate 1-6 LPM Per SpO2; 90 - 95%    POC Glucose Q1H    POC Glucose Once    POC Glucose PRN    POC Glucose Once    ECG 12 Lead Electrolyte Imbalance    Insert Peripheral IV    Insert Peripheral IV x2    Inpatient Admission    CBC & Differential    Green Top (Gel)    Lavender Top    Gold Top - SST    Light Blue Top    CBC & Differential       Medications Given in the Emergency Department:  Medications   sodium chloride 0.9 % flush 10 mL (has no administration in time range)   sodium chloride 0.9 % flush 10 mL (has no administration in time range)   sodium chloride 0.9 % flush 10 mL (has no administration in time range)   sodium chloride 0.9 % infusion 40 mL (has no administration in time range)   dextrose (GLUTOSE) oral gel 15 g (has no administration in time range)   dextrose (D50W) (25 g/50 mL) IV injection 10-50 mL (has no administration in time range)   glucagon (GLUCAGEN) injection 1 mg (has no administration in time range)   sodium chloride 0.9 % bolus (has no administration in time range)   sodium chloride 0.9 % infusion (has no administration in time range)   sodium chloride 0.9 % with KCl 20 mEq/L infusion (has no administration in time range)   sodium chloride 0.9 % with KCl 40 mEq/L infusion (has no administration in time range)   dextrose 5 % and sodium chloride 0.9 % infusion (has no administration in time range)   dextrose 5 % and sodium chloride 0.9 % with KCl 20 mEq/L infusion (has no administration in time range)   dextrose 5 % and sodium chloride 0.9 % with KCl 40 mEq/L infusion (has no administration in time range)   sodium chloride 0.45 % infusion (has no administration in time range)   sodium chloride 0.45 % with KCl 20 mEq/L infusion (has no administration in time range)   sodium chloride 0.45 % 1,000 mL with potassium chloride 40 mEq infusion (has no administration in time range)   dextrose 5 % and sodium chloride 0.45 % infusion (has no  administration in time range)   dextrose 5 % and sodium chloride 0.45 % with KCl 20 mEq/L infusion (has no administration in time range)   dextrose 5 % and sodium chloride 0.45 % with KCl 40 mEq/L infusion (has no administration in time range)   insulin regular 1 unit/mL in 0.9% sodium chloride (Glucommander) (2.8 Units/hr Intravenous New Bag 8/3/23 1541)   Potassium Replacement - Follow Nurse / BPA Driven Protocol (has no administration in time range)   Magnesium Standard Dose Replacement - Follow Nurse / BPA Driven Protocol (has no administration in time range)   Phosphorus Replacement - Follow Nurse / BPA Driven Protocol (has no administration in time range)   Calcium Replacement - Follow Nurse / BPA Driven Protocol (has no administration in time range)   sodium chloride 0.9 % bolus 2,000 mL (2,000 mL Intravenous New Bag 8/3/23 1540)        ED Course:         Labs:    Lab Results (last 24 hours)       Procedure Component Value Units Date/Time    POC Glucose Once [[id number]]  (Abnormal) Collected: 08/03/23 1151    Specimen: Blood Updated: 08/03/23 1154     Glucose 465 mg/dL      Comment: Serial Number: [id number]:  [id number]       CBC & Differential [[id number]]  (Abnormal) Collected: 08/03/23 1228    Specimen: Blood Updated: 08/03/23 1230    Narrative:      The following orders were created for panel order CBC & Differential.  Procedure                               Abnormality         Status                     ---------                               -----------         ------                     CBC Auto Differential[[id number]]        Abnormal            Final result                 Please view results for these tests on the individual orders.    Comprehensive Metabolic Panel [[id number]]  (Abnormal) Collected: 08/03/23 1228    Specimen: Blood Updated: 08/03/23 1259     Glucose 445 mg/dL      BUN 33 mg/dL      Creatinine 0.96 mg/dL      Sodium 133 mmol/L      Potassium 3.9 mmol/L      Chloride 98 mmol/L       CO2 13.0 mmol/L      Calcium 9.1 mg/dL      Total Protein 6.9 g/dL      Albumin 3.9 g/dL      ALT (SGPT) 7 U/L      AST (SGOT) 6 U/L      Alkaline Phosphatase 80 U/L      Total Bilirubin 0.4 mg/dL      Globulin 3.0 gm/dL      A/G Ratio 1.3 g/dL      BUN/Creatinine Ratio 34.4     Anion Gap 22.0 mmol/L      eGFR 94.5 mL/min/1.73     Narrative:      GFR Normal >60  Chronic Kidney Disease <60  Kidney Failure <15      CBC Auto Differential [[id number]]  (Abnormal) Collected: 08/03/23 1228    Specimen: Blood Updated: 08/03/23 1230     WBC 8.13 10*3/mm3      RBC 6.48 10*6/mm3      Hemoglobin 18.8 g/dL      Hematocrit 53.0 %      MCV 81.8 fL      MCH 29.0 pg      MCHC 35.5 g/dL      RDW 14.2 %      RDW-SD 39.8 fl      MPV 9.2 fL      Platelets 214 10*3/mm3      Neutrophil % 74.5 %      Lymphocyte % 15.7 %      Monocyte % 8.5 %      Eosinophil % 0.2 %      Basophil % 0.6 %      Immature Grans % 0.5 %      Neutrophils, Absolute 6.05 10*3/mm3      Lymphocytes, Absolute 1.28 10*3/mm3      Monocytes, Absolute 0.69 10*3/mm3      Eosinophils, Absolute 0.02 10*3/mm3      Basophils, Absolute 0.05 10*3/mm3      Immature Grans, Absolute 0.04 10*3/mm3      nRBC 0.0 /100 WBC     Acetone [[id number]]  (Abnormal) Collected: 08/03/23 1228    Specimen: Blood Updated: 08/03/23 1338     Acetone Small    Phosphorus [[id number]]  (Abnormal) Collected: 08/03/23 1228    Specimen: Blood Updated: 08/03/23 1511     Phosphorus 2.3 mg/dL     Magnesium [[id number]]  (Normal) Collected: 08/03/23 1228    Specimen: Blood Updated: 08/03/23 1511     Magnesium 2.1 mg/dL     Osmolality, Serum [[id number]] Collected: 08/03/23 1228    Specimen: Blood Updated: 08/03/23 1500    Hemoglobin A1c [[id number]] Collected: 08/03/23 1228    Specimen: Blood Updated: 08/03/23 1459    High Sensitivity Troponin T [[id number]]  (Normal) Collected: 08/03/23 1228    Specimen: Blood Updated: 08/03/23 1513     HS Troponin T 9 ng/L     Narrative:      High Sensitive Troponin T  Reference Range:  <10.0 ng/L- Negative Female for AMI  <15.0 ng/L- Negative Male for AMI  >=10 - Abnormal Female indicating possible myocardial injury.  >=15 - Abnormal Male indicating possible myocardial injury.   Clinicians would have to utilize clinical acumen, EKG, Troponin, and serial changes to determine if it is an Acute Myocardial Infarction or myocardial injury due to an underlying chronic condition.         Blood Gas, Venous -With Co-Ox Panel: Yes [[id number]]  (Abnormal) Collected: 08/03/23 1439    Specimen: Venous Blood Updated: 08/03/23 1441     pH, Venous 7.244 pH Units      pCO2, Venous 31.6 mm Hg      pO2, Venous 34.0 mm Hg      HCO3, Venous 13.4 mmol/L      Base Excess, Venous -12.3 mmol/L      O2 Saturation, Venous 71.3 %      Hemoglobin, Blood Gas 19.6 g/dL      Carboxyhemoglobin 2.5 %      Methemoglobin 0.10 %      Oxyhemoglobin 69.4 %      FHHB 28.0 %      Note --     Site Drawn by RN     Modality N/A     FIO2 --    POC Glucose Once [[id number]]  (Abnormal) Collected: 08/03/23 1537    Specimen: Blood Updated: 08/03/23 1538     Glucose 344 mg/dL      Comment: Serial Number: [id number]:  [id number]                Imaging:    XR Chest 1 View    Result Date: 8/3/2023  PROCEDURE: XR CHEST 1 VW  COMPARISON: None  INDICATIONS: Assess for Fluid Overload  FINDINGS:   The lungs are well-expanded. The heart and pulmonary vasculature are within normal limits. No pleural effusions are identified. There are no active appearing infiltrates.  IMPRESSION: No active disease.  RADHA SAMSON MD       Electronically Signed and Approved By: RADHA SAMSON MD on 8/03/2023 at 15:20                Differential Diagnosis and Discussion:    Metabolic: Differential diagnosis includes but is not limited to hypertension, hyperglycemia, hyperkalemia, hypocalcemia, metabolic acidosis, hypokalemia, hypoglycemia, malnutrition, hypothyroidism, hyperthyroidism, and adrenal insufficiency.     All labs were reviewed and  interpreted by me.    MDM  Number of Diagnoses or Management Options  Diabetic ketoacidosis without coma associated with other specified diabetes mellitus  Diagnosis management comments: In summary this is a 53-year-old male patient presents to the emergency department for evaluation elevated blood sugar.  Been found to be in diabetic ketoacidosis today with a serum pH of 7.24, elevated blood sugar with an anion gap of 22 decreased serum bicarb.  Patient's been given IV fluids in emergency department and initiated on an insulin drip.  Discussed the case with his PCP Dr. Veronica who admit the patient to the ICU for further treatment and care.  Patient remained stable in the emergency department throughout the entirety of this visit.  Patient case has been discussed with the PCP team who will admit to the hospital for further evaluation and continuation of treatment.             Patient Care Considerations:    CT ABDOMEN AND PELVIS: I considered ordering a CT scan of the abdomen and pelvis however benign abdominal examination      Consultants/Shared Management Plan:    PCP: I have discussed the case with Dr. Veronica who agrees to accept the patient for admission.    Social Determinants of Health:    Patient is independent, reliable, and has access to care.       Disposition and Care Coordination:    Admit:   Through independent evaluation of the patient's history, physical, and imperical data, the patient meets criteria for observation/admission to the hospital.        Final diagnoses:   Diabetic ketoacidosis without coma associated with other specified diabetes mellitus        ED Disposition       ED Disposition   Decision to Admit    Condition   --    Comment   Level of Care: Critical Care [6]   Diagnosis: DKA (diabetic ketoacidosis) [956171]   Admitting Physician: LEONIDAS VERONICA [[id number]]   Attending Physician: LEONIDAS VERONICA [[id number]]   Certification: I Certify That Inpatient Hospital Services Are Medically Necessary For  Greater Than 2 Midnights                 This medical record created using voice recognition software.             Albin Bruner MD  08/03/23 [id number]

## 2023-08-03 NOTE — PAYOR COMM NOTE
"Dena Ramos (53 y.o. Male)     Admitted from ED to Critical care.   Requesting Inpatient Auth for DOS: 8/3/2023.   PATIENT INFORMATION  Name:  Dnea Ramos  MRN#:     [id number]  :  1970       ADMISSION INFORMATION  CLASS: Inpatient   DOS:  8/3/2023      CURRENT ATTENDING PROVIDER INFORMATION  Name/NPI: Dr Adarsh Veronica.  NPI: [id number]  Phone:                   [phone]     RENDERING FACILITY  Name:  [facility]   NPI:  [id number]  TID:  [id number]  Address:      [address] [place] [address] [place] [id number]  Phone  [phone]      CASE MANAGEMENT CONTACT INFORMATION  Phone:      [phone]  Fax:           [phone]          Date of Birth   1970    Social Security Number       Address   [address] [place], [place] [address]    Home Phone   [phone]    MRN   [id number]       [employer]   Other    Marital Status                               Admission Date   8/3/23    Admission Type   Emergency    Admitting Provider   Adarsh Veronica MD    Attending Provider   Albin Bruner MD    Department, Room/Bed   [facility] EMERGENCY ROOM,        Discharge Date       Discharge Disposition       Discharge Destination                                 Attending Provider: Albin Bruner MD    Allergies: No Known Allergies    Isolation: None   Infection: None   Code Status: CPR    Ht: 172.7 cm (68\")   Wt: 96.5 kg (212 lb 11.9 oz)    Admission Cmt: None   Principal Problem: DKA (diabetic ketoacidosis) [E11.10]                   Active Insurance as of 8/3/2023       Primary Coverage       Payor Plan Insurance Group Employer/Plan Group    [facility] BY JERMAINE [id number]       Payor Plan Address Payor Plan Phone Number Payor Plan Fax Number Effective Dates    [address] [address]   2021 - None Entered    [facility] [address]         Subscriber Name Subscriber Birth Date Member ID       DENA RAMOS 1970 [id number]  "                   Diabetes RRG Inpatient Care by Jordyn Jacob RN       Indications Met: Reviewed on 8/3/2023 by Jordyn Jacob RN       Created Using Review Status Review Entered   [facility] for Case Management Primary Completed 8/3/2023 1716       Created By   Jordyn Jacob RN       Criteria Set Name - Subset   Diabetes RRG Inpatient Care      Criteria Review   Diabetes RRG Inpatient Care     Overall Determination: Indications Met     Criteria:  [×] Admission is indicated for  1 or more  of the following :      [×] Diabetic ketoacidosis that requires inpatient management, as indicated by  ALL  of the following :          [×] Hyperglycemia (eg, plasma glucose greater than 200 mg/dL (11.10 mmol/L)) [D]              8/3/2023  5:16 PM                  -- 8/3/2023  5:16 PM by Jordyn Jacob RN --                      Glucose 465 on arrival          [×] Ketonuria or ketonemia (eg, serum beta hydroxybutyrate greater than 3 mmol/L, or moderate to large ketonuria) [E]              8/3/2023  5:16 PM                  -- 8/3/2023  5:16 PM by Jordyn Jacob RN --                      Ketonemia (+ acetone blood) & ketonuria (4+ ketones urine).          [×] Acidosis (eg, arterial or venous pH less than 7.30, or serum bicarbonate less than 15 mEq/L (mmol/L)) [F]              8/3/2023  5:16 PM                  -- 8/3/2023  5:16 PM by Jordyn Jacob RN --                      Venous pH 7.244; Bicarb 13.4.          [×] Inpatient management appropriate, as indicated by  1 or more  of the following :              [×] Acidosis [F] (eg, arterial or venous pH less than 7.30) that recurs or persists despite observation care                  8/3/2023  5:16 PM                      -- 8/3/2023  5:16 PM by Jordyn Jacob RN --                          Venous pH 7.244     Notes:  -- 8/3/2023  5:16 PM by Jordyn Jacob RN --      To ED c/o N/V x several days, dry mouth & elevated thirst. Has DM but isn't on sq insulin. Contacted PCP & was  referred to ED.             PMHx: DM; brain injury.             ED results: Glucose 465; Na+ 133; BUN 33; Phosphorus 2.3; Anion gap 22.0;             Venous ABG: pH 7.244; Bicarb 13.4. UA: 4+ ketones; SG > 1.030. CXR Neg.             In ED: IV NS 2000ml bolus. Insulin Drip continuous.            Admit: Critical care; O2; IV insulin drip- titrate per protocol; BMP q4h; Mg; Phosphorus q4h; IVFs per DKA protocol;                                 Emergency Department Notes        Albin rBuner MD at 08/03/23 1415          Time: 2:15 PM EDT  Date of encounter:  8/3/2023  Independent Historian/Clinical History and Information was obtained by:   Patient    History is limited by: N/A    Chief Complaint: High blood sugar      History of Present Illness:  Patient is a 53 y.o. year old male who presents to the emergency department for evaluation of elevated blood sugar.  Patient is a known diabetic but is not on insulin reports he has had vomiting for the last several days.  He presented to his primary doctor's office he reports he looked very dehydrated and had elevated blood sugar.  On arrival to the emergency department patient does complain of being thirsty and having dry mucous membranes.    HPI    Patient Care Team  Primary Care Provider: Provider, No Known    Past Medical History:     No Known Allergies  Past Medical History:   Diagnosis Date    Brain injury     Diabetes      History reviewed. No pertinent surgical history.  History reviewed. No pertinent family history.    Home Medications:  Prior to Admission medications    Not on File        Social History:   Social History     Tobacco Use    Smoking status: Never    Smokeless tobacco: Never   Vaping Use    Vaping Use: Never used   Substance Use Topics    Alcohol use: Never    Drug use: Defer         Review of Systems:  Review of Systems   Constitutional:  Negative for chills and fever.   HENT:  Negative for congestion, rhinorrhea and sore throat.    Eyes:  Negative  "for pain and visual disturbance.   Respiratory:  Negative for apnea, cough, chest tightness and shortness of breath.    Cardiovascular:  Negative for chest pain and palpitations.   Gastrointestinal:  Positive for nausea and vomiting. Negative for abdominal pain and diarrhea.   Genitourinary:  Negative for difficulty urinating and dysuria.   Musculoskeletal:  Negative for joint swelling and myalgias.   Skin:  Negative for color change.   Neurological:  Negative for seizures and headaches.   Psychiatric/Behavioral: Negative.     All other systems reviewed and are negative.     Physical Exam:  /82 (BP Location: Right arm)   Pulse 109   Temp 98.2 °F (36.8 °C) (Oral)   Resp 15   Ht 172.7 cm (68\")   Wt 96.5 kg (212 lb 11.9 oz)   SpO2 95%   BMI 32.35 kg/m²     Physical Exam  Vitals and nursing note reviewed.   Constitutional:       General: He is not in acute distress.     Appearance: Normal appearance. He is not toxic-appearing.   HENT:      Head: Normocephalic and atraumatic.      Jaw: There is normal jaw occlusion.      Mouth/Throat:      Mouth: Mucous membranes are dry.   Eyes:      General: Lids are normal.      Extraocular Movements: Extraocular movements intact.      Conjunctiva/sclera: Conjunctivae normal.      Pupils: Pupils are equal, round, and reactive to light.   Cardiovascular:      Rate and Rhythm: Regular rhythm. Tachycardia present.      Pulses: Normal pulses.      Heart sounds: Normal heart sounds.   Pulmonary:      Effort: Pulmonary effort is normal. No respiratory distress.      Breath sounds: Normal breath sounds. No wheezing or rhonchi.   Abdominal:      General: Abdomen is flat.      Palpations: Abdomen is soft.      Tenderness: There is no abdominal tenderness. There is no guarding or rebound.   Musculoskeletal:         General: Normal range of motion.      Cervical back: Normal range of motion and neck supple.      Right lower leg: No edema.      Left lower leg: No edema.   Skin:     " General: Skin is warm and dry.   Neurological:      Mental Status: He is alert and oriented to person, place, and time. Mental status is at baseline.   Psychiatric:         Mood and Affect: Mood normal.                Procedures:  Procedures      Medical Decision Making:      Comorbidities that affect care:    Diabetes    External Notes reviewed:    None      The following orders were placed and all results were independently analyzed by me:  Orders Placed This Encounter   Procedures    XR Chest 1 View    [place] Draw    Comprehensive Metabolic Panel    Urinalysis With Microscopic If Indicated (No Culture) - Urine, Clean Catch    CBC Auto Differential    Blood Gas, Venous -With Co-Ox Panel: Yes    Acetone    Phosphorus    Magnesium    Osmolality, Serum    Hemoglobin A1c    High Sensitivity Troponin T    Basic Metabolic Panel    Magnesium    Phosphorus    CBC Auto Differential    High Sensitivity Troponin T 2Hr    NPO Diet NPO Type: Strict NPO    Undress & Gown    Vital Signs    Vital Signs    Strict Intake & Output    Daily Weights    Continuous Pulse Oximetry    Saline Lock & Maintain IV Access    Corrected Serum Sodium = Measured Sodium + [1.6 x ((Glucose - 100)/100)]    Do NOT Discontinue Insulin Infusion Until 2 Hours After First Dose of Basal SQ Insulin    Prior to Initiating Glucommander™, Ensure All Prior Insulin Orders Are Discontinued    Do Not Start Insulin Infusion if Potassium < 3.3    Use a Dedicated Line for Insulin Infusion (If Possible).  May Use a Carrier Fluid of NS at KVO Rate if Insulin Rate is Insufficient to Maintain IV Patency.  Prime IV Line With Insulin Infusion    Glucommander Must Be Discontinued if Insulin Infusion is Discontinued.  If Insulin Infusion is Restarted, Previous Glucommander Settings Must Be Discontinued and Re-Entered From New Order    Once DKA Meets Resolution Criteria - Call Provider for Transition Orders From IV to SQ Insulin    Utilize the Start Meal Feature / Meal Bolus  Feature in Glucommander if Patient Starts a Diet or Bolus Tube Feedings    Notify Provider - Insulin Infusion    RN to Release PRN POC Glucose Orders Per Glucommander    RN to Order STAT Glucose For Any POC Glucose <10 or >600    If Insulin Infusion is Paused - Follow Glucommander Instructions    DKA Patients With Phosphorus Level 1 or Higher Do NOT Require Replacement (While Insulin Infusing)    Inpatient Diabetes Educator Consult    Inpatient Hospitalist Consult    IP General Consult (Use specialty-specific consult if known)    Oxygen Therapy- Nasal Cannula; Titrate 1-6 LPM Per SpO2; 90 - 95%    POC Glucose Q1H    POC Glucose Once    POC Glucose PRN    POC Glucose Once    ECG 12 Lead Electrolyte Imbalance    Insert Peripheral IV    Insert Peripheral IV x2    Inpatient Admission    CBC & Differential    Green Top (Gel)    Lavender Top    Gold Top - SST    Light Blue Top    CBC & Differential       Medications Given in the Emergency Department:  Medications   sodium chloride 0.9 % flush 10 mL (has no administration in time range)   sodium chloride 0.9 % flush 10 mL (has no administration in time range)   sodium chloride 0.9 % flush 10 mL (has no administration in time range)   sodium chloride 0.9 % infusion 40 mL (has no administration in time range)   dextrose (GLUTOSE) oral gel 15 g (has no administration in time range)   dextrose (D50W) (25 g/50 mL) IV injection 10-50 mL (has no administration in time range)   glucagon (GLUCAGEN) injection 1 mg (has no administration in time range)   sodium chloride 0.9 % bolus (has no administration in time range)   sodium chloride 0.9 % infusion (has no administration in time range)   sodium chloride 0.9 % with KCl 20 mEq/L infusion (has no administration in time range)   sodium chloride 0.9 % with KCl 40 mEq/L infusion (has no administration in time range)   dextrose 5 % and sodium chloride 0.9 % infusion (has no administration in time range)   dextrose 5 % and sodium chloride  0.9 % with KCl 20 mEq/L infusion (has no administration in time range)   dextrose 5 % and sodium chloride 0.9 % with KCl 40 mEq/L infusion (has no administration in time range)   sodium chloride 0.45 % infusion (has no administration in time range)   sodium chloride 0.45 % with KCl 20 mEq/L infusion (has no administration in time range)   sodium chloride 0.45 % 1,000 mL with potassium chloride 40 mEq infusion (has no administration in time range)   dextrose 5 % and sodium chloride 0.45 % infusion (has no administration in time range)   dextrose 5 % and sodium chloride 0.45 % with KCl 20 mEq/L infusion (has no administration in time range)   dextrose 5 % and sodium chloride 0.45 % with KCl 40 mEq/L infusion (has no administration in time range)   insulin regular 1 unit/mL in 0.9% sodium chloride (Glucommander) (2.8 Units/hr Intravenous New Bag 8/3/23 1541)   Potassium Replacement - Follow Nurse / BPA Driven Protocol (has no administration in time range)   Magnesium Standard Dose Replacement - Follow Nurse / BPA Driven Protocol (has no administration in time range)   Phosphorus Replacement - Follow Nurse / BPA Driven Protocol (has no administration in time range)   Calcium Replacement - Follow Nurse / BPA Driven Protocol (has no administration in time range)   sodium chloride 0.9 % bolus 2,000 mL (2,000 mL Intravenous New Bag 8/3/23 1540)        ED Course:         Labs:    Lab Results (last 24 hours)       Procedure Component Value Units Date/Time    POC Glucose Once [[id number]]  (Abnormal) Collected: 08/03/23 1151    Specimen: Blood Updated: 08/03/23 1154     Glucose 465 mg/dL      Comment: Serial Number: [id number]:  [id number]       CBC & Differential [[id number]]  (Abnormal) Collected: 08/03/23 1228    Specimen: Blood Updated: 08/03/23 1230    Narrative:      The following orders were created for panel order CBC & Differential.  Procedure                               Abnormality         Status                      ---------                               -----------         ------                     CBC Auto Differential[[id number]]        Abnormal            Final result                 Please view results for these tests on the individual orders.    Comprehensive Metabolic Panel [[id number]]  (Abnormal) Collected: 08/03/23 1228    Specimen: Blood Updated: 08/03/23 1259     Glucose 445 mg/dL      BUN 33 mg/dL      Creatinine 0.96 mg/dL      Sodium 133 mmol/L      Potassium 3.9 mmol/L      Chloride 98 mmol/L      CO2 13.0 mmol/L      Calcium 9.1 mg/dL      Total Protein 6.9 g/dL      Albumin 3.9 g/dL      ALT (SGPT) 7 U/L      AST (SGOT) 6 U/L      Alkaline Phosphatase 80 U/L      Total Bilirubin 0.4 mg/dL      Globulin 3.0 gm/dL      A/G Ratio 1.3 g/dL      BUN/Creatinine Ratio 34.4     Anion Gap 22.0 mmol/L      eGFR 94.5 mL/min/1.73     Narrative:      GFR Normal >60  Chronic Kidney Disease <60  Kidney Failure <15      CBC Auto Differential [[id number]]  (Abnormal) Collected: 08/03/23 1228    Specimen: Blood Updated: 08/03/23 1230     WBC 8.13 10*3/mm3      RBC 6.48 10*6/mm3      Hemoglobin 18.8 g/dL      Hematocrit 53.0 %      MCV 81.8 fL      MCH 29.0 pg      MCHC 35.5 g/dL      RDW 14.2 %      RDW-SD 39.8 fl      MPV 9.2 fL      Platelets 214 10*3/mm3      Neutrophil % 74.5 %      Lymphocyte % 15.7 %      Monocyte % 8.5 %      Eosinophil % 0.2 %      Basophil % 0.6 %      Immature Grans % 0.5 %      Neutrophils, Absolute 6.05 10*3/mm3      Lymphocytes, Absolute 1.28 10*3/mm3      Monocytes, Absolute 0.69 10*3/mm3      Eosinophils, Absolute 0.02 10*3/mm3      Basophils, Absolute 0.05 10*3/mm3      Immature Grans, Absolute 0.04 10*3/mm3      nRBC 0.0 /100 WBC     Acetone [[id number]]  (Abnormal) Collected: 08/03/23 1228    Specimen: Blood Updated: 08/03/23 1338     Acetone Small    Phosphorus [[id number]]  (Abnormal) Collected: 08/03/23 1228    Specimen: Blood Updated: 08/03/23 1511     Phosphorus 2.3 mg/dL     Magnesium  [[id number]]  (Normal) Collected: 08/03/23 1228    Specimen: Blood Updated: 08/03/23 1511     Magnesium 2.1 mg/dL     Osmolality, Serum [[id number]] Collected: 08/03/23 1228    Specimen: Blood Updated: 08/03/23 1500    Hemoglobin A1c [[id number]] Collected: 08/03/23 1228    Specimen: Blood Updated: 08/03/23 1459    High Sensitivity Troponin T [[id number]]  (Normal) Collected: 08/03/23 1228    Specimen: Blood Updated: 08/03/23 1513     HS Troponin T 9 ng/L     Narrative:      High Sensitive Troponin T Reference Range:  <10.0 ng/L- Negative Female for AMI  <15.0 ng/L- Negative Male for AMI  >=10 - Abnormal Female indicating possible myocardial injury.  >=15 - Abnormal Male indicating possible myocardial injury.   Clinicians would have to utilize clinical acumen, EKG, Troponin, and serial changes to determine if it is an Acute Myocardial Infarction or myocardial injury due to an underlying chronic condition.         Blood Gas, Venous -With Co-Ox Panel: Yes [[id number]]  (Abnormal) Collected: 08/03/23 1439    Specimen: Venous Blood Updated: 08/03/23 1441     pH, Venous 7.244 pH Units      pCO2, Venous 31.6 mm Hg      pO2, Venous 34.0 mm Hg      HCO3, Venous 13.4 mmol/L      Base Excess, Venous -12.3 mmol/L      O2 Saturation, Venous 71.3 %      Hemoglobin, Blood Gas 19.6 g/dL      Carboxyhemoglobin 2.5 %      Methemoglobin 0.10 %      Oxyhemoglobin 69.4 %      FHHB 28.0 %      Note --     Site Drawn by RN     Modality N/A     FIO2 --    POC Glucose Once [[id number]]  (Abnormal) Collected: 08/03/23 1537    Specimen: Blood Updated: 08/03/23 1538     Glucose 344 mg/dL      Comment: Serial Number: [id number]:  [id number]                Imaging:    XR Chest 1 View    Result Date: 8/3/2023  PROCEDURE: XR CHEST 1 VW  COMPARISON: None  INDICATIONS: Assess for Fluid Overload  FINDINGS:   The lungs are well-expanded. The heart and pulmonary vasculature are within normal limits. No pleural effusions are identified. There are no  active appearing infiltrates.  IMPRESSION: No active disease.  RADHA SAMSON MD       Electronically Signed and Approved By: RADHA SAMSON MD on 8/03/2023 at 15:20                Differential Diagnosis and Discussion:    Metabolic: Differential diagnosis includes but is not limited to hypertension, hyperglycemia, hyperkalemia, hypocalcemia, metabolic acidosis, hypokalemia, hypoglycemia, malnutrition, hypothyroidism, hyperthyroidism, and adrenal insufficiency.     All labs were reviewed and interpreted by me.    MDM  Number of Diagnoses or Management Options  Diabetic ketoacidosis without coma associated with other specified diabetes mellitus  Diagnosis management comments: In summary this is a 53-year-old male patient presents to the emergency department for evaluation elevated blood sugar.  Been found to be in diabetic ketoacidosis today with a serum pH of 7.24, elevated blood sugar with an anion gap of 22 decreased serum bicarb.  Patient's been given IV fluids in emergency department and initiated on an insulin drip.  Discussed the case with his PCP Dr. Veronica who admit the patient to the ICU for further treatment and care.  Patient remained stable in the emergency department throughout the entirety of this visit.  Patient case has been discussed with the PCP team who will admit to the hospital for further evaluation and continuation of treatment.             Patient Care Considerations:    CT ABDOMEN AND PELVIS: I considered ordering a CT scan of the abdomen and pelvis however benign abdominal examination      Consultants/Shared Management Plan:    PCP: I have discussed the case with Dr. Veronica who agrees to accept the patient for admission.    Social Determinants of Health:    Patient is independent, reliable, and has access to care.       Disposition and Care Coordination:    Admit:   Through independent evaluation of the patient's history, physical, and imperical data, the patient meets criteria for  observation/admission to the hospital.        Final diagnoses:   Diabetic ketoacidosis without coma associated with other specified diabetes mellitus        ED Disposition       ED Disposition   Decision to Admit    Condition   --    Comment   Level of Care: Critical Care [6]   Diagnosis: DKA (diabetic ketoacidosis) [482089]   Admitting Physician: LEONIDAS MCFADDEN [[id number]]   Attending Physician: LEONIDAS MCFADDEN [[id number]]   Certification: I Certify That Inpatient Hospital Services Are Medically Necessary For Greater Than 2 Midnights                 This medical record created using voice recognition software.             Albin Bruner MD  08/03/23 1614      Electronically signed by Albin Bruner MD at 08/03/23 1614       Vital Signs (last day)       Date/Time Temp Temp src Pulse Resp BP Patient Position SpO2    08/03/23 1645 -- -- 101 -- 158/80 -- 98    08/03/23 1238 -- -- -- -- 141/82 -- --    08/03/23 1216 98.2 (36.8) Oral 109 15 -- -- 95          Current Facility-Administered Medications   Medication Dose Route Frequency Provider Last Rate Last Admin    Calcium Replacement - Follow Nurse / BPA Driven Protocol   Does not apply PRN Albin Bruner MD        dextrose (D50W) (25 g/50 mL) IV injection 10-50 mL  10-50 mL Intravenous Q15 Min PRN Albin Bruner MD        dextrose (GLUTOSE) oral gel 15 g  15 g Oral Q15 Min PRN Albin Bruner MD        dextrose 5 % and sodium chloride 0.45 % infusion  150 mL/hr Intravenous Continuous PRN Albin Bruner MD        dextrose 5 % and sodium chloride 0.45 % with KCl 20 mEq/L infusion  150 mL/hr Intravenous Continuous PRN Albin Bruner MD        dextrose 5 % and sodium chloride 0.45 % with KCl 40 mEq/L infusion  150 mL/hr Intravenous Continuous PRN Albin Bruner MD        dextrose 5 % and sodium chloride 0.9 % infusion  150 mL/hr Intravenous Continuous PRN Albin Bruner MD        dextrose 5 % and sodium chloride 0.9 % with KCl 20 mEq/L infusion  150 mL/hr Intravenous Continuous  TAWANDAN Albin Bruner MD        dextrose 5 % and sodium chloride 0.9 % with KCl 40 mEq/L infusion  150 mL/hr Intravenous Continuous PRN Albin Bruner MD        glucagon (GLUCAGEN) injection 1 mg  1 mg Intramuscular Q15 Min PRN Albin Bruner MD        insulin regular 1 unit/mL in 0.9% sodium chloride (Glucommander)  0-100 Units/hr Intravenous Titrated Albin Bruner MD 2.8 mL/hr at 08/03/23 1541 2.8 Units/hr at 08/03/23 1541    Magnesium Standard Dose Replacement - Follow Nurse / BPA Driven Protocol   Does not apply TAWANDAN Albin Bruner MD        Phosphorus Replacement - Follow Nurse / BPA Driven Protocol   Does not apply PRN Albin Bruner MD        Potassium Replacement - Follow Nurse / BPA Driven Protocol   Does not apply Albin Felix MD        sodium chloride 0.45 % 1,000 mL with potassium chloride 40 mEq infusion  250 mL/hr Intravenous Continuous PRN Albin Bruner MD        sodium chloride 0.45 % infusion  250 mL/hr Intravenous Continuous PRN Albin Bruner MD        sodium chloride 0.45 % with KCl 20 mEq/L infusion  250 mL/hr Intravenous Continuous PRN Albin Bruner MD        sodium chloride 0.9 % flush 10 mL  10 mL Intravenous PRN Albin Bruner MD        sodium chloride 0.9 % flush 10 mL  10 mL Intravenous Q12H Albin Bruner MD        sodium chloride 0.9 % flush 10 mL  10 mL Intravenous PRN Albin Bruner MD        sodium chloride 0.9 % infusion 40 mL  40 mL Intravenous PRN Albin Bruner MD        sodium chloride 0.9 % infusion  250 mL/hr Intravenous Continuous PRN Albin Bruner MD        sodium chloride 0.9 % with KCl 20 mEq/L infusion  250 mL/hr Intravenous Continuous PRN Albin Bruner MD        sodium chloride 0.9 % with KCl 40 mEq/L infusion  250 mL/hr Intravenous Continuous PRN Albin Bruner MD         Current Outpatient Medications   Medication Sig Dispense Refill    Dilantin 100 MG capsule Take 2 capsules by mouth 2 (Two) Times a Day.      Glucotrol XL 5 MG ER tablet Take 1  tablet by mouth Daily.      dapagliflozin Propanediol (Farxiga) 10 MG tablet Take 10 mg by mouth Daily.      topiramate (TOPAMAX) 100 MG tablet Take 1 tablet by mouth 2 (Two) Times a Day.       Lab Results (last 24 hours)       Procedure Component Value Units Date/Time    Hemoglobin A1c [[id number]]  (Abnormal) Collected: 08/03/23 1228    Specimen: Blood Updated: 08/03/23 1716     Hemoglobin A1C 10.40 %     Narrative:      Hemoglobin A1C Ranges:    Increased Risk for Diabetes  5.7% to 6.4%  Diabetes                     >= 6.5%  Diabetic Goal                < 7.0%    Urinalysis, Microscopic Only - Urine, Clean Catch [[id number]]  (Abnormal) Collected: 08/03/23 1651    Specimen: Urine, Clean Catch Updated: 08/03/23 1707     RBC, UA 0-2 /HPF      WBC, UA 0-2 /HPF      Bacteria, UA None Seen /HPF      Squamous Epithelial Cells, UA 0-2 /HPF      Hyaline Casts, UA 0-2 /LPF      Methodology Automated Microscopy    Urinalysis With Microscopic If Indicated (No Culture) - Urine, Clean Catch [[id number]]  (Abnormal) Collected: 08/03/23 1651    Specimen: Urine, Clean Catch Updated: 08/03/23 1707     Color, UA Yellow     Appearance, UA Clear     pH, UA 5.5     Specific Gravity, UA >1.030     Glucose, UA >=1000 mg/dL (3+)     Ketones, UA >=160 mg/dL (4+)     Bilirubin, UA Negative     Blood, UA Trace     Protein, UA 30 mg/dL (1+)     Leuk Esterase, UA Negative     Nitrite, UA Negative     Urobilinogen, UA 1.0 E.U./dL    POC Glucose Once [[id number]]  (Abnormal) Collected: 08/03/23 1645    Specimen: Blood Updated: 08/03/23 1646     Glucose 337 mg/dL      Comment: Serial Number: [id number]:  [id number]       POC Glucose Once [[id number]]  (Abnormal) Collected: 08/03/23 1537    Specimen: Blood Updated: 08/03/23 1538     Glucose 344 mg/dL      Comment: Serial Number: [id number]:  [id number]       High Sensitivity Troponin T [[id number]]  (Normal) Collected: 08/03/23 1228    Specimen: Blood Updated: 08/03/23 1513     HS Troponin  T 9 ng/L     Narrative:      High Sensitive Troponin T Reference Range:  <10.0 ng/L- Negative Female for AMI  <15.0 ng/L- Negative Male for AMI  >=10 - Abnormal Female indicating possible myocardial injury.  >=15 - Abnormal Male indicating possible myocardial injury.   Clinicians would have to utilize clinical acumen, EKG, Troponin, and serial changes to determine if it is an Acute Myocardial Infarction or myocardial injury due to an underlying chronic condition.         Phosphorus [[id number]]  (Abnormal) Collected: 08/03/23 1228    Specimen: Blood Updated: 08/03/23 1511     Phosphorus 2.3 mg/dL     Magnesium [[id number]]  (Normal) Collected: 08/03/23 1228    Specimen: Blood Updated: 08/03/23 1511     Magnesium 2.1 mg/dL     Osmolality, Serum [[id number]] Collected: 08/03/23 1228    Specimen: Blood Updated: 08/03/23 1500    Blood Gas, Venous -With Co-Ox Panel: Yes [[id number]]  (Abnormal) Collected: 08/03/23 1439    Specimen: Venous Blood Updated: 08/03/23 1441     pH, Venous 7.244 pH Units      pCO2, Venous 31.6 mm Hg      pO2, Venous 34.0 mm Hg      HCO3, Venous 13.4 mmol/L      Base Excess, Venous -12.3 mmol/L      O2 Saturation, Venous 71.3 %      Hemoglobin, Blood Gas 19.6 g/dL      Carboxyhemoglobin 2.5 %      Methemoglobin 0.10 %      Oxyhemoglobin 69.4 %      FHHB 28.0 %      Note --     Site Drawn by RN     Modality N/A     FIO2 --    Acetone [[id number]]  (Abnormal) Collected: 08/03/23 1228    Specimen: Blood Updated: 08/03/23 1338     Acetone Small    Comprehensive Metabolic Panel [[id number]]  (Abnormal) Collected: 08/03/23 1228    Specimen: Blood Updated: 08/03/23 1259     Glucose 445 mg/dL      BUN 33 mg/dL      Creatinine 0.96 mg/dL      Sodium 133 mmol/L      Potassium 3.9 mmol/L      Chloride 98 mmol/L      CO2 13.0 mmol/L      Calcium 9.1 mg/dL      Total Protein 6.9 g/dL      Albumin 3.9 g/dL      ALT (SGPT) 7 U/L      AST (SGOT) 6 U/L      Alkaline Phosphatase 80 U/L      Total Bilirubin 0.4 mg/dL       Globulin 3.0 gm/dL      A/G Ratio 1.3 g/dL      BUN/Creatinine Ratio 34.4     Anion Gap 22.0 mmol/L      eGFR 94.5 mL/min/1.73     Narrative:      GFR Normal >60  Chronic Kidney Disease <60  Kidney Failure <15      CBC & Differential [[id number]]  (Abnormal) Collected: 08/03/23 1228    Specimen: Blood Updated: 08/03/23 1230    Narrative:      The following orders were created for panel order CBC & Differential.  Procedure                               Abnormality         Status                     ---------                               -----------         ------                     CBC Auto Differential[[id number]]        Abnormal            Final result                 Please view results for these tests on the individual orders.    CBC Auto Differential [[id number]]  (Abnormal) Collected: 08/03/23 1228    Specimen: Blood Updated: 08/03/23 1230     WBC 8.13 10*3/mm3      RBC 6.48 10*6/mm3      Hemoglobin 18.8 g/dL      Hematocrit 53.0 %      MCV 81.8 fL      MCH 29.0 pg      MCHC 35.5 g/dL      RDW 14.2 %      RDW-SD 39.8 fl      MPV 9.2 fL      Platelets 214 10*3/mm3      Neutrophil % 74.5 %      Lymphocyte % 15.7 %      Monocyte % 8.5 %      Eosinophil % 0.2 %      Basophil % 0.6 %      Immature Grans % 0.5 %      Neutrophils, Absolute 6.05 10*3/mm3      Lymphocytes, Absolute 1.28 10*3/mm3      Monocytes, Absolute 0.69 10*3/mm3      Eosinophils, Absolute 0.02 10*3/mm3      Basophils, Absolute 0.05 10*3/mm3      Immature Grans, Absolute 0.04 10*3/mm3      nRBC 0.0 /100 WBC     [place] Draw [[id number]] Collected: 08/03/23 1228    Specimen: Blood Updated: 08/03/23 1229    Narrative:      The following orders were created for panel order [place] Draw.  Procedure                               Abnormality         Status                     ---------                               -----------         ------                     Green Top (Gel)[[id number]]                                  Final result               Lavender  Top[[id number]]                                     Final result               Gold Top - SST[[id number]]                                   Final result               Light Blue Top[[id number]]                                   Final result                 Please view results for these tests on the individual orders.    Green Top (Gel) [[id number]] Collected: 08/03/23 1228    Specimen: Blood Updated: 08/03/23 1229     Extra Tube Hold for add-ons.     Comment: Auto resulted.       Lavender Top [[id number]] Collected: 08/03/23 1228    Specimen: Blood Updated: 08/03/23 1229     Extra Tube hold for add-on     Comment: Auto resulted       Gold Top - SST [[id number]] Collected: 08/03/23 1228    Specimen: Blood Updated: 08/03/23 1229     Extra Tube Hold for add-ons.     Comment: Auto resulted.       Light Blue Top [[id number]] Collected: 08/03/23 1228    Specimen: Blood Updated: 08/03/23 1229     Extra Tube Hold for add-ons.     Comment: Auto resulted       POC Glucose Once [[id number]]  (Abnormal) Collected: 08/03/23 1151    Specimen: Blood Updated: 08/03/23 1154     Glucose 465 mg/dL      Comment: Serial Number: [id number]:  [id number]             Orders (last 24 hrs)        Start     Ordered    08/04/23 0600  Daily Weights  Daily       08/03/23 1457    08/03/23 2100  sodium chloride 0.9 % flush 10 mL  Every 12 Hours Scheduled         08/03/23 1457    08/03/23 1706  Urinalysis, Microscopic Only - Urine, Clean Catch  Once         08/03/23 1705    08/03/23 1647  POC Glucose Once  PROCEDURE ONCE        Comments: Complete no more than 45 minutes prior to patient eating      08/03/23 1645    08/03/23 1617  Code Status and Medical Interventions:  Continuous         08/03/23 1617    08/03/23 1613  Inpatient Admission  Once         08/03/23 1612    08/03/23 1600  Basic Metabolic Panel  Every 4 Hours       08/03/23 1457    08/03/23 1600  Magnesium  Every 4 Hours       08/03/23 1457    08/03/23 1600  Phosphorus  Every 4 Hours        08/03/23 1457    08/03/23 1554  IP General Consult (Use specialty-specific consult if known)  Once        Provider:  Adarsh Veronica MD    08/03/23 1553    08/03/23 1539  POC Glucose Once  PROCEDURE ONCE        Comments: Complete no more than 45 minutes prior to patient eating      08/03/23 1537    08/03/23 1515  sodium chloride 0.9 % bolus 2,000 mL  Once         08/03/23 1457    08/03/23 1515  sodium chloride 0.9 % bolus  Continuous         08/03/23 1457    08/03/23 1515  insulin regular 1 unit/mL in 0.9% sodium chloride (Glucommander)  Titrated        Note to Pharmacy: Upon initiation of Glucommander insulin drip, make sure all other insulin orders and anti-diabetic medications have been discontinued.    08/03/23 1457    08/03/23 1500  Vital Signs  Every Hour       08/03/23 1457    08/03/23 1500  Strict Intake & Output  Every Hour      Comments: While on Insulin Infusion    08/03/23 1457    08/03/23 1458  Continuous Pulse Oximetry  Continuous         08/03/23 1457    08/03/23 1458  Oxygen Therapy- Nasal Cannula; Titrate 1-6 LPM Per SpO2; 90 - 95%  Continuous         08/03/23 1457    08/03/23 1458  Insert Peripheral IV x2  Once         08/03/23 1457    08/03/23 1458  Saline Lock & Maintain IV Access  Continuous         08/03/23 1457    08/03/23 1458  Corrected Serum Sodium = Measured Sodium + [1.6 x ((Glucose - 100)/100)]  Continuous         08/03/23 1457    08/03/23 1458  Do NOT Discontinue Insulin Infusion Until 2 Hours After First Dose of Basal SQ Insulin  Continuous         08/03/23 1457    08/03/23 1458  Prior to Initiating Glucommander™, Ensure All Prior Insulin Orders Are Discontinued  Once         08/03/23 1457    08/03/23 1458  Do Not Start Insulin Infusion if Potassium < 3.3  Per Order Details         08/03/23 1457    08/03/23 1458  Use a Dedicated Line for Insulin Infusion (If Possible).  May Use a Carrier Fluid of NS at KVO Rate if Insulin Rate is Insufficient to Maintain IV Patency.  Prime IV Line With  Insulin Infusion  Continuous         08/03/23 1457    08/03/23 1458  Glucommander Must Be Discontinued if Insulin Infusion is Discontinued.  If Insulin Infusion is Restarted, Previous Glucommander Settings Must Be Discontinued and Re-Entered From New Order  Per Order Details         08/03/23 1457    08/03/23 1458  Once DKA Meets Resolution Criteria - Call Provider for Transition Orders From IV to SQ Insulin  Per Order Details        Comments: RESOLUTION of DKA:   Blood Glucose < 200 mg/dL, AND TWO of the Following:   - Serum Bicarbonate > 15   - Venous pH > 7.3   - Calculated Anion Gap </= 12 mEq/L    08/03/23 1457    08/03/23 1458  NPO Diet NPO Type: Strict NPO  Diet Effective Now         08/03/23 1457    08/03/23 1458  Utilize the Start Meal Feature / Meal Bolus Feature in Glucommander if Patient Starts a Diet or Bolus Tube Feedings  Until Discontinued         08/03/23 1457    08/03/23 1458  Notify Provider - Insulin Infusion  Until Discontinued         08/03/23 1457    08/03/23 1458  Inpatient Diabetes Educator Consult  Once        Provider:  (Not yet assigned)    08/03/23 1457    08/03/23 1458  CBC & Differential  STAT         08/03/23 1457    08/03/23 1458  Phosphorus  STAT         08/03/23 1457    08/03/23 1458  Magnesium  STAT         08/03/23 1457    08/03/23 1458  Osmolality, Serum  STAT         08/03/23 1457    08/03/23 1458  Hemoglobin A1c  STAT         08/03/23 1457    08/03/23 1458  XR Chest 1 View  1 Time Imaging         08/03/23 1457    08/03/23 1458  ECG 12 Lead Electrolyte Imbalance  STAT         08/03/23 1457    08/03/23 1458  High Sensitivity Troponin T  STAT         08/03/23 1457    08/03/23 1458  RN to Release PRN POC Glucose Orders Per Glucommander  Continuous        Comments: Glucommander Recommended POC Glucose Testing Will Vary Between Every 15 Minutes & Every 2 Hours   Release PRN POC Glucose Orders as Needed    08/03/23 1457    08/03/23 1458  RN to Order STAT Glucose For Any POC Glucose  <10 or >600  Continuous        Comments: Do Not Delay Treatment of Unstable Patient to Obtain Glucose Sample  Inform Provider of Results    08/03/23 1457    08/03/23 1458  DKA Patients With Phosphorus Level 1 or Higher Do NOT Require Replacement (While Insulin Infusing)  Continuous         08/03/23 1457    08/03/23 1458  Inpatient Hospitalist Consult  Once        Specialty:  Hospitalist  Provider:  (Not yet assigned)    08/03/23 1457    08/03/23 1458  CBC Auto Differential  PROCEDURE ONCE         08/03/23 1458    08/03/23 1457  sodium chloride 0.9 % infusion  Continuous PRN         08/03/23 1457    08/03/23 1457  sodium chloride 0.9 % with KCl 20 mEq/L infusion  Continuous PRN         08/03/23 1457    08/03/23 1457  sodium chloride 0.9 % with KCl 40 mEq/L infusion  Continuous PRN         08/03/23 1457    08/03/23 1457  dextrose 5 % and sodium chloride 0.9 % infusion  Continuous PRN         08/03/23 1457    08/03/23 1457  dextrose 5 % and sodium chloride 0.9 % with KCl 20 mEq/L infusion  Continuous PRN         08/03/23 1457    08/03/23 1457  dextrose 5 % and sodium chloride 0.9 % with KCl 40 mEq/L infusion  Continuous PRN         08/03/23 1457    08/03/23 1457  sodium chloride 0.45 % infusion  Continuous PRN         08/03/23 1457    08/03/23 1457  sodium chloride 0.45 % with KCl 20 mEq/L infusion  Continuous PRN         08/03/23 1457    08/03/23 1457  sodium chloride 0.45 % 1,000 mL with potassium chloride 40 mEq infusion  Continuous PRN         08/03/23 1457    08/03/23 1457  dextrose 5 % and sodium chloride 0.45 % infusion  Continuous PRN         08/03/23 1457    08/03/23 1457  dextrose 5 % and sodium chloride 0.45 % with KCl 20 mEq/L infusion  Continuous PRN         08/03/23 1457    08/03/23 1457  dextrose 5 % and sodium chloride 0.45 % with KCl 40 mEq/L infusion  Continuous PRN         08/03/23 1457    08/03/23 1457  Potassium Replacement - Follow Nurse / BPA Driven Protocol  As Needed         08/03/23 1457     08/03/23 1457  Magnesium Standard Dose Replacement - Follow Nurse / BPA Driven Protocol  As Needed         08/03/23 1457    08/03/23 1457  Phosphorus Replacement - Follow Nurse / BPA Driven Protocol  As Needed         08/03/23 1457    08/03/23 1457  Calcium Replacement - Follow Nurse / BPA Driven Protocol  As Needed         08/03/23 1457    08/03/23 1457  sodium chloride 0.9 % flush 10 mL  As Needed         08/03/23 1457    08/03/23 1457  sodium chloride 0.9 % infusion 40 mL  As Needed         08/03/23 1457    08/03/23 1457  dextrose (GLUTOSE) oral gel 15 g  Every 15 Minutes PRN         08/03/23 1457    08/03/23 1457  dextrose (D50W) (25 g/50 mL) IV injection 10-50 mL  Every 15 Minutes PRN         08/03/23 1457    08/03/23 1457  glucagon (GLUCAGEN) injection 1 mg  Every 15 Minutes PRN         08/03/23 1457    08/03/23 1428  High Sensitivity Troponin T 2Hr  PROCEDURE ONCE         08/03/23 1513    08/03/23 1325  Blood Gas, Venous -With Co-Ox Panel: Yes  Once         08/03/23 1324    08/03/23 1325  Acetone  Once         08/03/23 1324    08/03/23 1200  POC Glucose Q1H  Every Hour      Comments: Complete no more than 45 minutes prior to patient eating      08/03/23 1152    08/03/23 1155  POC Glucose Once  PROCEDURE ONCE        Comments: Complete no more than 45 minutes prior to patient eating      08/03/23 1151    08/03/23 1153  NPO Diet NPO Type: Strict NPO  Diet Effective Now,   Status:  Canceled         08/03/23 1152    08/03/23 1153  Undress & Gown  Once         08/03/23 1152    08/03/23 1153  Cardiac Monitoring  Continuous        Comments: Follow Standing Orders As Outlined in Process Instructions (Open Order Report to View Full Instructions)    08/03/23 1152    08/03/23 1153  Continuous Pulse Oximetry  Continuous,   Status:  Canceled         08/03/23 1152    08/03/23 1153  Vital Signs  Per Hospital Policy         08/03/23 1152    08/03/23 1153  Insert Peripheral IV  Once         08/03/23 1152    08/03/23 1153   [place] Draw  Once         08/03/23 1152    08/03/23 1153  CBC & Differential  Once         08/03/23 1152    08/03/23 1153  Comprehensive Metabolic Panel  Once         08/03/23 1152    08/03/23 1153  Urinalysis With Microscopic If Indicated (No Culture) - Urine, Clean Catch  Once         08/03/23 1152    08/03/23 1153  Green Top (Gel)  PROCEDURE ONCE         08/03/23 1152    08/03/23 1153  Lavender Top  PROCEDURE ONCE         08/03/23 1152    08/03/23 1153  Gold Top - SST  PROCEDURE ONCE         08/03/23 1152    08/03/23 1153  Light Blue Top  PROCEDURE ONCE         08/03/23 1152    08/03/23 1153  CBC Auto Differential  PROCEDURE ONCE         08/03/23 1152    08/03/23 1152  Oxygen Therapy- Nasal Cannula; Titrate 1-6 LPM Per SpO2; 90 - 95%  Continuous PRN,   Status:  Canceled       08/03/23 1152    08/03/23 1152  sodium chloride 0.9 % flush 10 mL  As Needed         08/03/23 1152    05/02/23 0000  Glucotrol XL 5 MG ER tablet  Daily         08/03/23 1512    Unscheduled  POC Glucose PRN  As Needed      Comments: Glucommander Recommended POC Glucose Testing Will Vary Between Every 15 Minutes & Every 2 Hours Release PRN POC Glucose Orders as Needed      08/03/23 1457    Unscheduled  Treat Hypoglycemia As Recommended By Glucommander™ & Notify Provider of Treatment  As Needed      Comments: Follow Hypoglycemia Orders As Outlined in Process Instructions (Open Order Report to View Full Instructions)  Notify Provider Any Time Hypoglycemia Treatment is Administered    08/03/23 1457    Unscheduled  If Insulin Infusion is Paused - Follow Glucommander Instructions  As Needed       08/03/23 1457    --  topiramate (TOPAMAX) 100 MG tablet  2 Times Daily         08/03/23 1512    --  Dilantin 100 MG capsule  2 Times Daily         08/03/23 1512    --  dapagliflozin Propanediol (Farxiga) 10 MG tablet  Daily         08/03/23 1512    Signed and Held  phenytoin ER (DILANTIN) capsule 200 mg  2 Times Daily         Signed and Held    Signed and  Held  topiramate (TOPAMAX) tablet 100 mg  2 Times Daily         Signed and Held    Signed and Held  Vital Signs  Every 4 Hours       Signed and Held    Signed and Held  Notify Provider (With Default Parameters)  Until Discontinued         Signed and Held    Signed and Held  Up With Assistance  As Needed         Signed and Held    Signed and Held  Diet: Cardiac Diets, Diabetic Diets; Healthy Heart (2-3 Na+); Consistent Carbohydrate; Texture: Regular Texture (IDDSI 7); Fluid Consistency: Thin (IDDSI 0)  Diet Effective Now         Signed and Held    Signed and Held  Intake & Output  Every Shift       Signed and Held    Signed and Held  Weigh Patient  Once         Signed and Held    Signed and Held  Oral Care  2 Times Daily       Signed and Held    Signed and Held  Inpatient Case Management  Consult  Once        Provider:  (Not yet assigned)    Signed and Held    Signed and Held  CBC Auto Differential  Morning Draw         Signed and Held    Signed and Held  Comprehensive Metabolic Panel  Morning Draw         Signed and Held    Signed and Held  Insert Peripheral IV  Once         Signed and Held    Signed and Held  Saline Lock & Maintain IV Access  Continuous         Signed and Held    Signed and Held  sodium chloride 0.9 % flush 10 mL  Every 12 Hours Scheduled         Signed and Held    Signed and Held  sodium chloride 0.9 % flush 10 mL  As Needed         Signed and Held    Signed and Held  sodium chloride 0.9 % infusion 40 mL  As Needed         Signed and Held    Signed and Held  acetaminophen (TYLENOL) tablet 650 mg  Every 4 Hours PRN         Signed and Held    Signed and Held  HYDROcodone-acetaminophen (NORCO) 5-325 MG per tablet 1 tablet  Every 4 Hours PRN         Signed and Held    Signed and Held  morphine injection 2 mg  Every 2 Hours PRN        See Hyperspace for full Linked Orders Report.    Signed and Held    Signed and Held  naloxone (NARCAN) injection 0.4 mg  Every 5 Minutes PRN        See  Hyperspace for full Linked Orders Report.    Signed and Held    Signed and Held  HYDROmorphone (DILAUDID) injection 0.5 mg  Every 2 Hours PRN        See Hyperspace for full Linked Orders Report.    Signed and Held    Signed and Held  naloxone (NARCAN) injection 0.4 mg  Every 5 Minutes PRN        See Hyperspace for full Linked Orders Report.    Signed and Held    Signed and Held  aluminum-magnesium hydroxide-simethicone (MAALOX MAX) 400-400-40 MG/5ML suspension 15 mL  Every 6 Hours PRN         Signed and Held    Signed and Held  famotidine (PEPCID) tablet 40 mg  Daily         Signed and Held    Signed and Held  ALPRAZolam (XANAX) tablet 0.25 mg  Every 6 Hours PRN         Signed and Held    Signed and Held  sennosides-docusate (PERICOLACE) 8.6-50 MG per tablet 1 tablet  2 Times Daily        See Hyperspace for full Linked Orders Report.    Signed and Held    Signed and Held  polyethylene glycol (MIRALAX) packet 17 g  Daily PRN        See Hyperspace for full Linked Orders Report.    Signed and Held    Signed and Held  bisacodyl (DULCOLAX) EC tablet 5 mg  Daily PRN        See Hyperspace for full Linked Orders Report.    Signed and Held    Signed and Held  bisacodyl (DULCOLAX) suppository 10 mg  Daily PRN        See Hyperspace for full Linked Orders Report.    Signed and Held    Signed and Held  ondansetron (ZOFRAN) injection 4 mg  Every 4 Hours PRN         Signed and Held    Signed and Held  temazepam (RESTORIL) capsule 15 mg  Nightly PRN         Signed and Held    Signed and Held  Pharmacy to Dose enoxaparin (LOVENOX)  Continuous PRN         Signed and Held    Signed and Held  Hemoglobin & Hematocrit, Blood  Once         Signed and Held    Signed and Held  Procalcitonin  STAT         Signed and Held    Signed and Held  TSH  STAT         Signed and Held

## 2023-08-04 LAB
ALBUMIN SERPL-MCNC: 2.8 G/DL (ref 3.5–5.2)
ALBUMIN/GLOB SERPL: 1.3 G/DL
ALP SERPL-CCNC: 60 U/L (ref 39–117)
ALT SERPL W P-5'-P-CCNC: 5 U/L (ref 1–41)
ANION GAP SERPL CALCULATED.3IONS-SCNC: 10.7 MMOL/L (ref 5–15)
ANION GAP SERPL CALCULATED.3IONS-SCNC: 8.9 MMOL/L (ref 5–15)
ANION GAP SERPL CALCULATED.3IONS-SCNC: 9.5 MMOL/L (ref 5–15)
AST SERPL-CCNC: 5 U/L (ref 1–40)
BASOPHILS # BLD AUTO: 0.05 10*3/MM3 (ref 0–0.2)
BASOPHILS NFR BLD AUTO: 0.7 % (ref 0–1.5)
BILIRUB SERPL-MCNC: 0.3 MG/DL (ref 0–1.2)
BUN SERPL-MCNC: 15 MG/DL (ref 6–20)
BUN SERPL-MCNC: 18 MG/DL (ref 6–20)
BUN SERPL-MCNC: 20 MG/DL (ref 6–20)
BUN/CREAT SERPL: 33.3 (ref 7–25)
BUN/CREAT SERPL: 36 (ref 7–25)
BUN/CREAT SERPL: 36.4 (ref 7–25)
CALCIUM SPEC-SCNC: 7.6 MG/DL (ref 8.6–10.5)
CALCIUM SPEC-SCNC: 7.7 MG/DL (ref 8.6–10.5)
CALCIUM SPEC-SCNC: 7.8 MG/DL (ref 8.6–10.5)
CHLORIDE SERPL-SCNC: 107 MMOL/L (ref 98–107)
CHLORIDE SERPL-SCNC: 107 MMOL/L (ref 98–107)
CHLORIDE SERPL-SCNC: 110 MMOL/L (ref 98–107)
CO2 SERPL-SCNC: 15.3 MMOL/L (ref 22–29)
CO2 SERPL-SCNC: 16.5 MMOL/L (ref 22–29)
CO2 SERPL-SCNC: 18.1 MMOL/L (ref 22–29)
CREAT SERPL-MCNC: 0.45 MG/DL (ref 0.76–1.27)
CREAT SERPL-MCNC: 0.5 MG/DL (ref 0.76–1.27)
CREAT SERPL-MCNC: 0.55 MG/DL (ref 0.76–1.27)
DEPRECATED RDW RBC AUTO: 38.8 FL (ref 37–54)
EGFRCR SERPLBLD CKD-EPI 2021: 118.5 ML/MIN/1.73
EGFRCR SERPLBLD CKD-EPI 2021: 122 ML/MIN/1.73
EGFRCR SERPLBLD CKD-EPI 2021: 125.9 ML/MIN/1.73
EOSINOPHIL # BLD AUTO: 0.1 10*3/MM3 (ref 0–0.4)
EOSINOPHIL NFR BLD AUTO: 1.5 % (ref 0.3–6.2)
ERYTHROCYTE [DISTWIDTH] IN BLOOD BY AUTOMATED COUNT: 13.2 % (ref 12.3–15.4)
GLOBULIN UR ELPH-MCNC: 2.2 GM/DL
GLUCOSE BLDC GLUCOMTR-MCNC: 120 MG/DL (ref 70–99)
GLUCOSE BLDC GLUCOMTR-MCNC: 121 MG/DL (ref 70–99)
GLUCOSE BLDC GLUCOMTR-MCNC: 132 MG/DL (ref 70–99)
GLUCOSE BLDC GLUCOMTR-MCNC: 140 MG/DL (ref 70–99)
GLUCOSE BLDC GLUCOMTR-MCNC: 141 MG/DL (ref 70–99)
GLUCOSE BLDC GLUCOMTR-MCNC: 154 MG/DL (ref 70–99)
GLUCOSE BLDC GLUCOMTR-MCNC: 157 MG/DL (ref 70–99)
GLUCOSE BLDC GLUCOMTR-MCNC: 160 MG/DL (ref 70–99)
GLUCOSE BLDC GLUCOMTR-MCNC: 163 MG/DL (ref 70–99)
GLUCOSE BLDC GLUCOMTR-MCNC: 163 MG/DL (ref 70–99)
GLUCOSE BLDC GLUCOMTR-MCNC: 196 MG/DL (ref 70–99)
GLUCOSE BLDC GLUCOMTR-MCNC: 202 MG/DL (ref 70–99)
GLUCOSE SERPL-MCNC: 148 MG/DL (ref 65–99)
GLUCOSE SERPL-MCNC: 176 MG/DL (ref 65–99)
GLUCOSE SERPL-MCNC: 195 MG/DL (ref 65–99)
HCT VFR BLD AUTO: 42.1 % (ref 37.5–51)
HGB BLD-MCNC: 15.1 G/DL (ref 13–17.7)
IMM GRANULOCYTES # BLD AUTO: 0.03 10*3/MM3 (ref 0–0.05)
IMM GRANULOCYTES NFR BLD AUTO: 0.4 % (ref 0–0.5)
LYMPHOCYTES # BLD AUTO: 1.99 10*3/MM3 (ref 0.7–3.1)
LYMPHOCYTES NFR BLD AUTO: 29.4 % (ref 19.6–45.3)
MAGNESIUM SERPL-MCNC: 1.8 MG/DL (ref 1.6–2.6)
MAGNESIUM SERPL-MCNC: 2 MG/DL (ref 1.6–2.6)
MAGNESIUM SERPL-MCNC: 2 MG/DL (ref 1.6–2.6)
MCH RBC QN AUTO: 29.3 PG (ref 26.6–33)
MCHC RBC AUTO-ENTMCNC: 35.9 G/DL (ref 31.5–35.7)
MCV RBC AUTO: 81.6 FL (ref 79–97)
MONOCYTES # BLD AUTO: 0.63 10*3/MM3 (ref 0.1–0.9)
MONOCYTES NFR BLD AUTO: 9.3 % (ref 5–12)
NEUTROPHILS NFR BLD AUTO: 3.97 10*3/MM3 (ref 1.7–7)
NEUTROPHILS NFR BLD AUTO: 58.7 % (ref 42.7–76)
NRBC BLD AUTO-RTO: 0 /100 WBC (ref 0–0.2)
PHOSPHATE SERPL-MCNC: 1.7 MG/DL (ref 2.5–4.5)
PHOSPHATE SERPL-MCNC: 2.1 MG/DL (ref 2.5–4.5)
PHOSPHATE SERPL-MCNC: 2.4 MG/DL (ref 2.5–4.5)
PLATELET # BLD AUTO: 150 10*3/MM3 (ref 140–450)
PMV BLD AUTO: 9.4 FL (ref 6–12)
POTASSIUM SERPL-SCNC: 3.2 MMOL/L (ref 3.5–5.2)
POTASSIUM SERPL-SCNC: 3.3 MMOL/L (ref 3.5–5.2)
POTASSIUM SERPL-SCNC: 3.5 MMOL/L (ref 3.5–5.2)
PROT SERPL-MCNC: 5 G/DL (ref 6–8.5)
QT INTERVAL: 365 MS
RBC # BLD AUTO: 5.16 10*6/MM3 (ref 4.14–5.8)
SODIUM SERPL-SCNC: 133 MMOL/L (ref 136–145)
SODIUM SERPL-SCNC: 133 MMOL/L (ref 136–145)
SODIUM SERPL-SCNC: 137 MMOL/L (ref 136–145)
WBC NRBC COR # BLD: 6.77 10*3/MM3 (ref 3.4–10.8)

## 2023-08-04 PROCEDURE — 83735 ASSAY OF MAGNESIUM: CPT | Performed by: INTERNAL MEDICINE

## 2023-08-04 PROCEDURE — 25010000002 ENOXAPARIN PER 10 MG: Performed by: INTERNAL MEDICINE

## 2023-08-04 PROCEDURE — 80053 COMPREHEN METABOLIC PANEL: CPT | Performed by: INTERNAL MEDICINE

## 2023-08-04 PROCEDURE — 96372 THER/PROPH/DIAG INJ SC/IM: CPT

## 2023-08-04 PROCEDURE — 25010000002 SODIUM CHLORIDE 0.9 % WITH KCL 20 MEQ 20-0.9 MEQ/L-% SOLUTION: Performed by: INTERNAL MEDICINE

## 2023-08-04 PROCEDURE — 99291 CRITICAL CARE FIRST HOUR: CPT | Performed by: INTERNAL MEDICINE

## 2023-08-04 PROCEDURE — 84100 ASSAY OF PHOSPHORUS: CPT | Performed by: INTERNAL MEDICINE

## 2023-08-04 PROCEDURE — 82948 REAGENT STRIP/BLOOD GLUCOSE: CPT

## 2023-08-04 PROCEDURE — 63710000001 INSULIN LISPRO (HUMAN) PER 5 UNITS: Performed by: INTERNAL MEDICINE

## 2023-08-04 PROCEDURE — 96366 THER/PROPH/DIAG IV INF ADDON: CPT

## 2023-08-04 PROCEDURE — 63710000001 INSULIN DETEMIR PER 5 UNITS: Performed by: INTERNAL MEDICINE

## 2023-08-04 PROCEDURE — 96368 THER/DIAG CONCURRENT INF: CPT

## 2023-08-04 PROCEDURE — 85025 COMPLETE CBC W/AUTO DIFF WBC: CPT | Performed by: INTERNAL MEDICINE

## 2023-08-04 RX ORDER — NICOTINE POLACRILEX 4 MG
15 LOZENGE BUCCAL
Status: DISCONTINUED | OUTPATIENT
Start: 2023-08-04 | End: 2023-08-05 | Stop reason: HOSPADM

## 2023-08-04 RX ORDER — SODIUM CHLORIDE AND POTASSIUM CHLORIDE 150; 900 MG/100ML; MG/100ML
75 INJECTION, SOLUTION INTRAVENOUS CONTINUOUS
Status: DISCONTINUED | OUTPATIENT
Start: 2023-08-04 | End: 2023-08-05 | Stop reason: HOSPADM

## 2023-08-04 RX ORDER — INSULIN LISPRO 100 [IU]/ML
2-9 INJECTION, SOLUTION INTRAVENOUS; SUBCUTANEOUS
Status: DISCONTINUED | OUTPATIENT
Start: 2023-08-04 | End: 2023-08-05 | Stop reason: HOSPADM

## 2023-08-04 RX ORDER — FENTANYL/ROPIVACAINE/NS/PF 2-625MCG/1
15 PLASTIC BAG, INJECTION (ML) EPIDURAL
Status: COMPLETED | OUTPATIENT
Start: 2023-08-04 | End: 2023-08-05

## 2023-08-04 RX ORDER — DEXTROSE MONOHYDRATE 25 G/50ML
25 INJECTION, SOLUTION INTRAVENOUS
Status: DISCONTINUED | OUTPATIENT
Start: 2023-08-04 | End: 2023-08-05 | Stop reason: HOSPADM

## 2023-08-04 RX ORDER — FENTANYL/ROPIVACAINE/NS/PF 2-625MCG/1
15 PLASTIC BAG, INJECTION (ML) EPIDURAL
Status: DISCONTINUED | OUTPATIENT
Start: 2023-08-04 | End: 2023-08-04 | Stop reason: SDUPTHER

## 2023-08-04 RX ORDER — POTASSIUM CHLORIDE 750 MG/1
20 CAPSULE, EXTENDED RELEASE ORAL ONCE
Status: COMPLETED | OUTPATIENT
Start: 2023-08-04 | End: 2023-08-04

## 2023-08-04 RX ADMIN — INSULIN DETEMIR 25 UNITS: 100 INJECTION, SOLUTION SUBCUTANEOUS at 22:05

## 2023-08-04 RX ADMIN — FAMOTIDINE 40 MG: 20 TABLET ORAL at 08:05

## 2023-08-04 RX ADMIN — POTASSIUM CHLORIDE, DEXTROSE MONOHYDRATE AND SODIUM CHLORIDE 150 ML/HR: 300; 5; 900 INJECTION, SOLUTION INTRAVENOUS at 05:21

## 2023-08-04 RX ADMIN — ENOXAPARIN SODIUM 40 MG: 100 INJECTION SUBCUTANEOUS at 22:07

## 2023-08-04 RX ADMIN — ALUMINUM HYDROXIDE, MAGNESIUM HYDROXIDE, AND DIMETHICONE 15 ML: 400; 400; 40 SUSPENSION ORAL at 12:31

## 2023-08-04 RX ADMIN — PHENYTOIN SODIUM 200 MG: 100 CAPSULE, EXTENDED RELEASE ORAL at 08:05

## 2023-08-04 RX ADMIN — TOPIRAMATE 100 MG: 100 TABLET, FILM COATED ORAL at 08:05

## 2023-08-04 RX ADMIN — POTASSIUM CHLORIDE, DEXTROSE MONOHYDRATE AND SODIUM CHLORIDE 150 ML/HR: 150; 5; 900 INJECTION, SOLUTION INTRAVENOUS at 00:49

## 2023-08-04 RX ADMIN — Medication 10 ML: at 22:08

## 2023-08-04 RX ADMIN — INSULIN LISPRO 4 UNITS: 100 INJECTION, SOLUTION INTRAVENOUS; SUBCUTANEOUS at 17:21

## 2023-08-04 RX ADMIN — POTASSIUM CHLORIDE AND SODIUM CHLORIDE 75 ML/HR: 900; 150 INJECTION, SOLUTION INTRAVENOUS at 17:21

## 2023-08-04 RX ADMIN — POTASSIUM PHOSPHATE, MONOBASIC AND POTASSIUM PHOSPHATE, DIBASIC 15 MMOL: 224; 236 INJECTION, SOLUTION, CONCENTRATE INTRAVENOUS at 08:05

## 2023-08-04 RX ADMIN — POTASSIUM CHLORIDE 20 MEQ: 10 CAPSULE, COATED, EXTENDED RELEASE ORAL at 17:21

## 2023-08-04 RX ADMIN — PHENYTOIN SODIUM 200 MG: 100 CAPSULE, EXTENDED RELEASE ORAL at 22:54

## 2023-08-04 RX ADMIN — SENNOSIDES AND DOCUSATE SODIUM 1 TABLET: 50; 8.6 TABLET ORAL at 08:05

## 2023-08-04 RX ADMIN — POTASSIUM PHOSPHATE, MONOBASIC AND POTASSIUM PHOSPHATE, DIBASIC 15 MMOL: 224; 236 INJECTION, SOLUTION, CONCENTRATE INTRAVENOUS at 11:08

## 2023-08-04 RX ADMIN — INSULIN DETEMIR 25 UNITS: 100 INJECTION, SOLUTION SUBCUTANEOUS at 08:47

## 2023-08-04 RX ADMIN — POTASSIUM PHOSPHATE, MONOBASIC AND POTASSIUM PHOSPHATE, DIBASIC 15 MMOL: 224; 236 INJECTION, SOLUTION, CONCENTRATE INTRAVENOUS at 05:21

## 2023-08-04 RX ADMIN — INSULIN LISPRO 2 UNITS: 100 INJECTION, SOLUTION INTRAVENOUS; SUBCUTANEOUS at 22:05

## 2023-08-04 RX ADMIN — Medication 10 ML: at 22:14

## 2023-08-04 NOTE — PROGRESS NOTES
[facility]     Progress Note    Patient Name: John Ramos  : 1970  MRN: [id number]  Primary Care Physician:  Provider, No Known  Date of admission: 8/3/2023    Subjective   Subjective       Chief Complaint:   Patient is critically ill in ICU with diabetic ketoacidosis, volume depletion, electrolyte imbalance.    Critical drips: Insulin drip  Lines and tubes: Peripheral lines    Over last 24 hours, patient was admitted with diabetic ketoacidosis.  He was started on insulin drip, was given fluid boluses and started on fluid exchange per DKA protocol.  Continue to monitor metabolic panel overnight.  Required 74 units insulin since admission.    Overnight, no acute events.     This morning,  He is taking oral diet.  Anion gap is closed.  No nausea or vomiting.  No fever or chills.  No significant chest pain or chest tightness.  Tolerating oral diet.  Currently on insulin drip at 2.5 units/h      Review of Systems  General:  Fatigue, No Fever  HEENT: No dysphagia, No Visual Changes, no rhinorrhea  Respiratory:  + cough,+Dyspnea, No Pleuritic Pain, no wheezing, no hemoptysis  Cardiovascular: Denies chest pain, denies palpitations,+FINLEY, No Chest Pressure  Gastrointestinal:  Abdominal Pain, Nausea, Vomiting, No Diarrhea  Genitourinary:  No Dysuria, No Frequency, No Hesitancy  Musculoskeletal: No muscle pain or swelling  Endocrine:  No Heat Intolerance, No Cold Intolerance, Fatigue  Hematologic:  No Bleeding, No Bruising  Psychiatric:  No Anxiety, No Depression  Neurologic:  No Confusion, No Headaches  Skin:  No Rash, No Open Wounds         Objective   Objective     Vitals:   Temp:  [98.2 °F (36.8 °C)-98.6 °F (37 °C)] 98.2 °F (36.8 °C)  Heart Rate:  [] 84  Resp:  [14-16] 16  BP: (110-159)/() 137/75  Physical Exam   Vital Signs Reviewed   WDWN, Alert, in mild distress, has conversational dyspnea  HEENT:  PERRL, EOMI.  OP, nares clear, no sinus tenderness  Neck:  Supple, no JVD, no  thyromegaly  Lymph: no axillary, cervical, supraclavicular lymphadenopathy noted bilaterally  Chest:  good aeration, clear to auscultation bilaterally, tympanic to percussion bilaterally, no work of breathing noted  CV: RRR, no MGR, pulses 2+, equal  Abd:  Soft, NT, ND, + BS, no HSM  EXT:  no clubbing, no cyanosis, no edema, no joint tenderness  Neuro:  A&Ox3, CN grossly intact, no focal deficits  Skin: No rashes or lesions noted      Result Review    Result Review:  I have personally reviewed the results from the time of this admission to 8/4/2023 07:44 EDT and agree with these findings:  [x]  Laboratory  [x]  Microbiology  [x]  Radiology  [x]  EKG/Telemetry   [x]  Cardiology/Vascular   []  Pathology  []  Old records  []  Other:  Most notable findings include:         Lab 08/04/23  0403 08/04/23  0009 08/03/23  2042 08/03/23  1718 08/03/23  1228   WBC 6.77  --   --  8.65 8.13   HEMOGLOBIN 15.1  --   --  17.9* 18.8*   HEMATOCRIT 42.1  --   --  50.7 53.0*   PLATELETS 150  --   --  174 214   SODIUM 133* 133* 130* 133* 133*   POTASSIUM 3.2* 3.5 3.6 3.2* 3.9   CHLORIDE 107 107 102 101 98   CO2 16.5* 15.3* 12.3* 12.6* 13.0*   BUN 18 20 25* 27* 33*   CREATININE 0.50* 0.55* 0.61* 0.72* 0.96   GLUCOSE 176* 195* 277* 313* 445*   CALCIUM 7.8* 7.6* 8.3* 8.4* 9.1   PHOSPHORUS 1.7* 2.4* 2.2* 1.8* 2.3*   TOTAL PROTEIN  --  5.0*  --   --  6.9   ALBUMIN  --  2.8*  --   --  3.9   GLOBULIN  --  2.2  --   --  3.0         Assessment & Plan   Assessment / Plan     Brief Patient Summary:  John Ramos is a 53 y.o. male   Diabetic ketoacidosis  Noncompliance with medications, patient was supposed to be on glipizide and Farxiga at home  Severe hypophosphatemia and hypokalemia  Hypocalcemia  Volume depletion  Severe hyperglycemia  Anion gap metabolic acidosis  Electrolyte derangements    Active Hospital Problems:  Active Hospital Problems    Diagnosis     **DKA (diabetic ketoacidosis)      Plan:   As required insulin 74 units since  admission.  Switch to basal bolus insulin now.  Start Levemir 25 units twice daily and sliding scale insulin.  Discontinue IV fluids.  Replete IV potassium phosphate.  Replete magnesium sulfate  Replete calcium gluconate IV.  Continue with oral diet.  Heart healthy diabetic diet  Will need diabetes management education.  Nutrition consult.  Monitor hemodynamics closely.   If stable through the day, can likely be transferred out of ICU this afternoon    DVT prophylaxis:  Medical DVT prophylaxis orders are present.    CODE STATUS:   Level Of Support Discussed With: Patient  Code Status (Patient has no pulse and is not breathing): CPR (Attempt to Resuscitate)  Medical Interventions (Patient has pulse or is breathing): Full Support  Release to patient: Routine Release      Patient is critically ill in ICU with diabetic ketoacidosis, severe hypophosphatemia, hypokalemia, hypocalcemia, volume depletion, severe hyperglycemia, anion gap metabolic acidosis and electrolyte derangements.  I spent 32 minutes of critical care time, excluding any procedure notes, in review, analysis, obtaining history and physical, formulating care plan, and I led multi-disciplinary critical care rounds with bedside nurse, respiratory therapist, clinical pharmacist and other allied services. I have discussed the case with primary service and other consultants as well.       Electronically signed by Rocky Galvan MD, 8/4/2023, 07:44 EDT.

## 2023-08-04 NOTE — PROGRESS NOTES
[facility]     Progress Note    Patient Name: John Ramos  : 1970  MRN: [id number]  Primary Care Physician:  Korin Young MD  Date of admission: 8/3/2023      Subjective   Brief summary.  Patient admitted with DKA, transferred out of ICU earlier this morning      HPI:  Feeling better, tolerating food no nausea vomiting, some epigastric discomfort.  No CP/ No SOA..    Review of Systems     Blood sugars doing better less than 200  No chest pain or shortness of breath  No blood in the stools or black-colored stools      Objective     Vitals:   Temp:  [97.1 °F (36.2 °C)-98.6 °F (37 °C)] 97.1 °F (36.2 °C)  Heart Rate:  [] 91  Resp:  [14-18] 18  BP: (110-159)/() 138/74    Physical Exam :     Elderly looking male not in acute distress no pallor  Heart regular lungs clear  Abdomen soft nontender  Oral mucosa moist      Result Review:  I have personally reviewed the results from the time of this admission to 2023 16:13 EDT and agree with these findings:  [x]  Laboratory  []  Microbiology  []  Radiology  []  EKG/Telemetry   []  Cardiology/Vascular   []  Pathology  []  Old records  []  Other:    Blood sugars less than 200  A1c greater than 10      Assessment / Plan       Active Hospital Problems:  Active Hospital Problems    Diagnosis     **DKA (diabetic ketoacidosis)        Plan:   Uncontrolled diabetes.  Still acidotic transfer out of ICU this morning.  Switch to subcu insulin.  At this point we will hydrate him with additional fluids.  Check acetone level in a.m..  If remains stable possible discharge tomorrow       DVT prophylaxis:  Medical DVT prophylaxis orders are present.    CODE STATUS:   Level Of Support Discussed With: Patient  Code Status (Patient has no pulse and is not breathing): CPR (Attempt to Resuscitate)  Medical Interventions (Patient has pulse or is breathing): Full Support  Release to patient: Routine Release            Electronically signed by Adarsh Veronica MD,  08/04/23, 4:13 PM EDT.

## 2023-08-04 NOTE — PLAN OF CARE
Goal Outcome Evaluation:  Plan of Care Reviewed With: patient        Progress: improving     VSS. A&Ox4. Pt on insulin gtt with fluid exchange. Electrolyte replacement protocols initiated.

## 2023-08-04 NOTE — PLAN OF CARE
Goal Outcome Evaluation:         Pt still complaining of epigastric pain when eating. MD is aware. Educated with return demonstration of insulin injection.

## 2023-08-04 NOTE — CONSULTS
Patient is a known diabetic, started on Farxiga and Glucotrol in September of 2022. Patient has an active PA for Farxiga that is current through September 2023 with 9 refills remaining. Patient has not had the prescription for Farxiga filled within the last 6 months, and last had Glucotrol filled on 5/2/2023-90 day supply, his only fill of that medication in the last six months. Encouraged patient to take his medications as prescribed by the provider.    Patient with a current HbA1c of 10.4% and an estimated average glucose of 252 mg/dL which makes him an appropriate candidate for insulin on discharge but it is doubtful that the patient will be compliant with that treatment regimen.

## 2023-08-05 ENCOUNTER — READMISSION MANAGEMENT (OUTPATIENT)
Dept: CALL CENTER | Facility: HOSPITAL | Age: 53
End: 2023-08-05
Payer: COMMERCIAL

## 2023-08-05 VITALS
RESPIRATION RATE: 18 BRPM | OXYGEN SATURATION: 95 % | DIASTOLIC BLOOD PRESSURE: 67 MMHG | HEIGHT: 68 IN | HEART RATE: 97 BPM | WEIGHT: 220.46 LBS | SYSTOLIC BLOOD PRESSURE: 117 MMHG | BODY MASS INDEX: 33.41 KG/M2 | TEMPERATURE: 98.7 F

## 2023-08-05 LAB
ACETONE BLD QL: ABNORMAL
ALBUMIN SERPL-MCNC: 3 G/DL (ref 3.5–5.2)
ALBUMIN/GLOB SERPL: 1.2 G/DL
ALP SERPL-CCNC: 65 U/L (ref 39–117)
ALT SERPL W P-5'-P-CCNC: 6 U/L (ref 1–41)
ANION GAP SERPL CALCULATED.3IONS-SCNC: 10.1 MMOL/L (ref 5–15)
AST SERPL-CCNC: 8 U/L (ref 1–40)
BASOPHILS # BLD AUTO: 0.04 10*3/MM3 (ref 0–0.2)
BASOPHILS NFR BLD AUTO: 0.7 % (ref 0–1.5)
BILIRUB SERPL-MCNC: 0.4 MG/DL (ref 0–1.2)
BUN SERPL-MCNC: 8 MG/DL (ref 6–20)
BUN/CREAT SERPL: 14.3 (ref 7–25)
CALCIUM SPEC-SCNC: 8.4 MG/DL (ref 8.6–10.5)
CHLORIDE SERPL-SCNC: 104 MMOL/L (ref 98–107)
CO2 SERPL-SCNC: 24.9 MMOL/L (ref 22–29)
CREAT SERPL-MCNC: 0.56 MG/DL (ref 0.76–1.27)
DEPRECATED RDW RBC AUTO: 39.7 FL (ref 37–54)
EGFRCR SERPLBLD CKD-EPI 2021: 117.9 ML/MIN/1.73
EOSINOPHIL # BLD AUTO: 0.08 10*3/MM3 (ref 0–0.4)
EOSINOPHIL NFR BLD AUTO: 1.3 % (ref 0.3–6.2)
ERYTHROCYTE [DISTWIDTH] IN BLOOD BY AUTOMATED COUNT: 13.2 % (ref 12.3–15.4)
GLOBULIN UR ELPH-MCNC: 2.5 GM/DL
GLUCOSE BLDC GLUCOMTR-MCNC: 130 MG/DL (ref 70–99)
GLUCOSE BLDC GLUCOMTR-MCNC: 229 MG/DL (ref 70–99)
GLUCOSE SERPL-MCNC: 151 MG/DL (ref 65–99)
HCT VFR BLD AUTO: 46.4 % (ref 37.5–51)
HGB BLD-MCNC: 16.2 G/DL (ref 13–17.7)
IMM GRANULOCYTES # BLD AUTO: 0.07 10*3/MM3 (ref 0–0.05)
IMM GRANULOCYTES NFR BLD AUTO: 1.1 % (ref 0–0.5)
LYMPHOCYTES # BLD AUTO: 1.65 10*3/MM3 (ref 0.7–3.1)
LYMPHOCYTES NFR BLD AUTO: 27 % (ref 19.6–45.3)
MAGNESIUM SERPL-MCNC: 2 MG/DL (ref 1.6–2.6)
MCH RBC QN AUTO: 28.9 PG (ref 26.6–33)
MCHC RBC AUTO-ENTMCNC: 34.9 G/DL (ref 31.5–35.7)
MCV RBC AUTO: 82.9 FL (ref 79–97)
MONOCYTES # BLD AUTO: 0.56 10*3/MM3 (ref 0.1–0.9)
MONOCYTES NFR BLD AUTO: 9.2 % (ref 5–12)
NEUTROPHILS NFR BLD AUTO: 3.7 10*3/MM3 (ref 1.7–7)
NEUTROPHILS NFR BLD AUTO: 60.7 % (ref 42.7–76)
NRBC BLD AUTO-RTO: 0 /100 WBC (ref 0–0.2)
PHOSPHATE SERPL-MCNC: 2.2 MG/DL (ref 2.5–4.5)
PLATELET # BLD AUTO: 153 10*3/MM3 (ref 140–450)
PMV BLD AUTO: 9.3 FL (ref 6–12)
POTASSIUM SERPL-SCNC: 3.5 MMOL/L (ref 3.5–5.2)
PROT SERPL-MCNC: 5.5 G/DL (ref 6–8.5)
RBC # BLD AUTO: 5.6 10*6/MM3 (ref 4.14–5.8)
SODIUM SERPL-SCNC: 139 MMOL/L (ref 136–145)
WBC NRBC COR # BLD: 6.1 10*3/MM3 (ref 3.4–10.8)

## 2023-08-05 PROCEDURE — 83735 ASSAY OF MAGNESIUM: CPT | Performed by: INTERNAL MEDICINE

## 2023-08-05 PROCEDURE — 82009 KETONE BODYS QUAL: CPT | Performed by: INTERNAL MEDICINE

## 2023-08-05 PROCEDURE — 82948 REAGENT STRIP/BLOOD GLUCOSE: CPT

## 2023-08-05 PROCEDURE — 25010000002 SODIUM CHLORIDE 0.9 % WITH KCL 20 MEQ 20-0.9 MEQ/L-% SOLUTION: Performed by: INTERNAL MEDICINE

## 2023-08-05 PROCEDURE — 85025 COMPLETE CBC W/AUTO DIFF WBC: CPT | Performed by: INTERNAL MEDICINE

## 2023-08-05 PROCEDURE — 96361 HYDRATE IV INFUSION ADD-ON: CPT

## 2023-08-05 PROCEDURE — 63710000001 INSULIN DETEMIR PER 5 UNITS: Performed by: INTERNAL MEDICINE

## 2023-08-05 PROCEDURE — 80053 COMPREHEN METABOLIC PANEL: CPT | Performed by: INTERNAL MEDICINE

## 2023-08-05 PROCEDURE — 84100 ASSAY OF PHOSPHORUS: CPT | Performed by: INTERNAL MEDICINE

## 2023-08-05 PROCEDURE — 63710000001 INSULIN LISPRO (HUMAN) PER 5 UNITS: Performed by: INTERNAL MEDICINE

## 2023-08-05 RX ORDER — INSULIN LISPRO 100 [IU]/ML
5 INJECTION, SOLUTION INTRAVENOUS; SUBCUTANEOUS
Qty: 4.5 ML | Refills: 0 | Status: SHIPPED | OUTPATIENT
Start: 2023-08-05 | End: 2023-09-04

## 2023-08-05 RX ORDER — INSULIN LISPRO 100 [IU]/ML
5 INJECTION, SOLUTION INTRAVENOUS; SUBCUTANEOUS
Status: SHIPPED | OUTPATIENT
Start: 2023-08-05 | End: 2023-08-15

## 2023-08-05 RX ADMIN — FAMOTIDINE 40 MG: 20 TABLET ORAL at 08:57

## 2023-08-05 RX ADMIN — INSULIN LISPRO 4 UNITS: 100 INJECTION, SOLUTION INTRAVENOUS; SUBCUTANEOUS at 12:06

## 2023-08-05 RX ADMIN — Medication 10 ML: at 08:57

## 2023-08-05 RX ADMIN — ALUMINUM HYDROXIDE, MAGNESIUM HYDROXIDE, AND DIMETHICONE 15 ML: 400; 400; 40 SUSPENSION ORAL at 09:09

## 2023-08-05 RX ADMIN — POTASSIUM CHLORIDE AND SODIUM CHLORIDE 75 ML/HR: 900; 150 INJECTION, SOLUTION INTRAVENOUS at 06:54

## 2023-08-05 RX ADMIN — INSULIN DETEMIR 25 UNITS: 100 INJECTION, SOLUTION SUBCUTANEOUS at 08:56

## 2023-08-05 RX ADMIN — PHENYTOIN SODIUM 200 MG: 100 CAPSULE, EXTENDED RELEASE ORAL at 08:56

## 2023-08-05 NOTE — DISCHARGE SUMMARY
[facility]         DISCHARGE SUMMARY    Patient Name: John Ramos  : 1970  MRN: [id number]    Date of Admission: 8/3/2023  Date of Discharge:  2023  Primary Care Physician: Korin Young MD    Consults       Date and Time Order Name Status Description    8/3/2023  3:53 PM IP General Consult (Use specialty-specific consult if known)      8/3/2023  2:58 PM Inpatient Hospitalist Consult              Presenting Problem:   DKA (diabetic ketoacidosis) [E11.10]  Diabetic ketoacidosis without coma associated with other specified diabetes mellitus [E13.10]    Active and Resolved Hospital Problems:  Active Hospital Problems    Diagnosis POA   • **DKA (diabetic ketoacidosis) [E11.10] Yes      Resolved Hospital Problems   No resolved problems to display.         Hospital Course     Hospital Course:  John Ramos is a 53 y.o. man.  The patient was brought to his primary care practitioner's office by his girlfriend with complaints of weakness and obtundation.  He was immediately referred to the emergency department where he was found to have hyperglycemia  and was in diabetic ketoacidosis  The patient was admitted to the ICU, DKA protocol was initiated, the patient quickly improved and was transitioned to subcutaneous insulin and oral diet  Today, is the third hospital day and the patient is at his baseline and is fully recovered from the diabetic ketoacidosis and is receiving subcutaneous insulin with better control of blood glucose  The patient affirmed that he was compliant with the medication regimen and dietary restrictions  Farxiga is one of his listed medications and this is known to be associated with causing diabetic ketoacidosis especially in the context of poor oral intake.  I spoke with the patient's pharmacy and it appears that he has never received the Farxiga from them and he has also not filled Dilantin and topiramate in the recent past.  The only medications  dispensed to him in the last 3 months was Glucotrol XL 10 mg which was filled on 5/2/2023.  I then spoke with the patient's girlfriend who stated that the patient manages his medication and he walks to the pharmacy whenever he needs a refill  I then discussed with the patient about this discrepancy and he stated that he had leftover medications which he had obtained from [facility] pharmacy and he was still taking them and therefore did not get the medications from [facility] pharmacy.    At any rate, it appears that the patient is not compliant with the medications and it explains the uncontrolled diabetes and A1c of 10.5.    However, it is not clear why the patient experienced diabetic ketoacidosis but the acidosis was mild and therefore the patient quickly recovered from this condition.  Advised the patient to be compliant with all his medications as the risk of developing ketoacidosis again in the future is high and it might lead to seizures  I called in the following new medications to the pharmacy including glucometer, the patient stated that he never had a glucometer and therefore does not check blood glucose.    1..  Lantus Solostar pen 25 units at bedtime  2.  Humalog KwikPen 5 units with each meal  3.  Glucometer with strips and lancets, with instruction to check blood glucose 4 times a day.    I also called home health care agency and ordered nursing services to assist the patient in taking insulin injections, monitoring blood glucose and to achieve optimal blood glucose control    Day of Discharge     Vital Signs:  Temp:  [97.1 °F (36.2 °C)-98.8 °F (37.1 °C)] 98.7 °F (37.1 °C)  Heart Rate:  [] 97  Resp:  [18] 18  BP: (109-140)/(61-74) 117/67  Physical Exam:  Lungs: Clear to auscultation bilaterally.  Cardiovascular.  Rate and rhythm is regular  Neuro: Awake, alert and oriented x3    Pertinent  and/or Most Recent Results     LAB RESULTS:  Lab Results   Component Value Date    WBC 6.10 08/05/2023     HGB 16.2 08/05/2023    HCT 46.4 08/05/2023    MCV 82.9 08/05/2023     08/05/2023     Lab Results   Component Value Date    GLUCOSE 151 (H) 08/05/2023    BUN 8 08/05/2023    CREATININE 0.56 (L) 08/05/2023    EGFR 117.9 08/05/2023    BCR 14.3 08/05/2023    K 3.5 08/05/2023    CO2 24.9 08/05/2023    CALCIUM 8.4 (L) 08/05/2023    ALBUMIN 3.0 (L) 08/05/2023    BILITOT 0.4 08/05/2023    AST 8 08/05/2023    ALT 6 08/05/2023     Brief Urine Lab Results  (Last result in the past 365 days)        Color   Clarity   Blood   Leuk Est   Nitrite   Protein   CREAT   Urine HCG        08/03/23 1651 Yellow   Clear   Trace   Negative   Negative   30 mg/dL (1+)                 Microbiology Results (last 10 days)       ** No results found for the last 240 hours. **                             Labs Pending at Discharge:        Discharge Details        Discharge Medications        New Medications        Instructions Start Date   Insulin Glargine 100 UNIT/ML injection pen  Commonly known as: LANTUS SOLOSTAR   25 Units, Subcutaneous, Nightly      Insulin Lispro (1 Unit Dial) 100 UNIT/ML solution pen-injector  Commonly known as: HumaLOG KwikPen   5 Units, Subcutaneous, 3 Times Daily With Meals, Formulary Compliance Approval             Continue These Medications        Instructions Start Date   Dilantin 100 MG capsule  Generic drug: phenytoin ER   200 mg, Oral, 2 Times Daily      topiramate 100 MG tablet  Commonly known as: TOPAMAX   100 mg, Oral, 2 Times Daily             Stop These Medications      Farxiga 10 MG tablet  Generic drug: dapagliflozin Propanediol     Glucotrol XL 5 MG ER tablet  Generic drug: glipizide              No Known Allergies      Discharge Disposition:  Home-Health Care Svc    Diet:  Hospital:  Diet Order   Procedures   • Diet: Cardiac Diets, Diabetic Diets; Healthy Heart (2-3 Na+); Consistent Carbohydrate; Texture: Soft to Chew (NDD 3); Soft to Chew: Chopped Meat; Fluid Consistency: Thin (IDDSI 0)          Discharge Activity: As tolerated        No future appointments.  Follow-up with Dr. Young in 1 week    Time spent on Discharge including face to face service: 65 minutes    Electronically signed by Emmanuel Allan MD, 08/05/23, 12:48 PM EDT.

## 2023-08-05 NOTE — PLAN OF CARE
Problem: Adult Inpatient Plan of Care  Goal: Plan of Care Review  Outcome: Met  Goal: Patient-Specific Goal (Individualized)  Outcome: Met  Goal: Absence of Hospital-Acquired Illness or Injury  Outcome: Met  Intervention: Identify and Manage Fall Risk  Recent Flowsheet Documentation  Taken 8/5/2023 0855 by Joellen Wayne RNA  Safety Promotion/Fall Prevention: safety round/check completed  Taken 8/5/2023 0720 by Joellen Wayne RNA  Safety Promotion/Fall Prevention: safety round/check completed  Intervention: Prevent and Manage VTE (Venous Thromboembolism) Risk  Recent Flowsheet Documentation  Taken 8/5/2023 0855 by Joellen Wayne RNA  Activity Management:   activity encouraged   bedrest  Goal: Optimal Comfort and Wellbeing  Outcome: Met  Intervention: Monitor Pain and Promote Comfort  Recent Flowsheet Documentation  Taken 8/5/2023 0855 by Joellen Wayne RNA  Pain Management Interventions:   quiet environment facilitated   care clustered  Intervention: Provide Person-Centered Care  Recent Flowsheet Documentation  Taken 8/5/2023 0855 by Joellen Wayne RNA  Trust Relationship/Rapport:   care explained   choices provided   emotional support provided   empathic listening provided   questions answered   questions encouraged   reassurance provided   thoughts/feelings acknowledged  Goal: Readiness for Transition of Care  Outcome: Met     Problem: Fall Injury Risk  Goal: Absence of Fall and Fall-Related Injury  Outcome: Met  Intervention: Identify and Manage Contributors  Recent Flowsheet Documentation  Taken 8/5/2023 0855 by Joellen Wayne RNA  Medication Review/Management: medications reviewed  Intervention: Promote Injury-Free Environment  Recent Flowsheet Documentation  Taken 8/5/2023 0855 by Joellen Wayne RNA  Safety Promotion/Fall Prevention: safety round/check completed  Taken 8/5/2023 0720 by Joellen Wayne RNA  Safety Promotion/Fall Prevention: safety round/check completed     Problem: Skin Injury Risk  Increased  Goal: Skin Health and Integrity  Outcome: Met   Goal Outcome Evaluation:

## 2023-08-05 NOTE — PLAN OF CARE
Goal Outcome Evaluation:           Progress: no change  Outcome Evaluation: Pt denied of pain or discomfort in this shift, vital sign within normal limits. Latest blood sugar check taken 196, 2 unit of humalog given and 25 unit of long acting insulin. Pt verbalized wanting to be discharge.   New open skin noted at penile area, clean with normal saline with gauze open air to dry.

## 2023-08-05 NOTE — PLAN OF CARE
Goal Outcome Evaluation:    Outcome Evaluation: Pt denied of pain or discomfort in this shift, vital sign within normal limits. Latest blood sugar check taken 196, 2 unit of humalog given and 25 unit of long acting insulin. Pt verbalized wanting to be discharge.   New open skin noted at penile area, clean with normal saline with gauze open air to dry.,